# Patient Record
Sex: FEMALE | Race: WHITE | Employment: OTHER | URBAN - METROPOLITAN AREA
[De-identification: names, ages, dates, MRNs, and addresses within clinical notes are randomized per-mention and may not be internally consistent; named-entity substitution may affect disease eponyms.]

---

## 2017-02-23 ENCOUNTER — TRANSFERRED RECORDS (OUTPATIENT)
Dept: HEALTH INFORMATION MANAGEMENT | Facility: CLINIC | Age: 62
End: 2017-02-23

## 2017-03-06 ENCOUNTER — TRANSFERRED RECORDS (OUTPATIENT)
Dept: HEALTH INFORMATION MANAGEMENT | Facility: CLINIC | Age: 62
End: 2017-03-06

## 2017-03-06 DIAGNOSIS — I10 HYPERTENSION GOAL BP (BLOOD PRESSURE) < 140/90: ICD-10-CM

## 2017-03-06 NOTE — TELEPHONE ENCOUNTER
Routing refill request to provider for review/approval because:  Labs not current:  NO K+ OR CREATININE DONE IN OVER A YEAR  Patient needs to be seen because:  LAST BP FOLLOW UP 2/2016

## 2017-03-06 NOTE — TELEPHONE ENCOUNTER
Disp Refills Start End LEIF   losartan-hydrochlorothiazide (HYZAAR) 100-25 MG per tablet 90 tablet 3 2/22/2016  No   Sig: Take 1 tablet by mouth daily     Last Office Visit with [facility], [facility] or [facility] prescribing provider: 4-8-16    Next 5 appointments (look out 90 days)     Mar 20, 2017 10:00 AM CDT   Marge Kohler with TIN Kelley CNP   [facility] ([facility])    [address] [address] [phone]                   Potassium   Date Value Ref Range Status   02/22/2016 4.3 3.4 - 5.3 mmol/L Final     Creatinine   Date Value Ref Range Status   02/22/2016 0.68 0.52 - 1.04 mg/dL Final     BP Readings from Last 3 Encounters:   04/18/16 126/74   02/22/16 118/70   02/19/15 112/70

## 2017-03-07 RX ORDER — LOSARTAN POTASSIUM AND HYDROCHLOROTHIAZIDE 25; 100 MG/1; MG/1
1 TABLET ORAL DAILY
Qty: 90 TABLET | Refills: 0 | Status: SHIPPED | OUTPATIENT
Start: 2017-03-07 | End: 2017-03-29

## 2017-03-08 NOTE — TELEPHONE ENCOUNTER
Return call from patient she has office visit appointment 3/20/2017    Refill was approved for 3/7/2017 - patient also says she has enough medication to get to appointment  - pharmacy auto generated her request    Closing encounter - no further actions needed at this time    Angelo Wetzel RN

## 2017-03-29 ENCOUNTER — OFFICE VISIT (OUTPATIENT)
Dept: FAMILY MEDICINE | Facility: CLINIC | Age: 62
End: 2017-03-29
Payer: COMMERCIAL

## 2017-03-29 VITALS
OXYGEN SATURATION: 99 % | HEART RATE: 70 BPM | DIASTOLIC BLOOD PRESSURE: 70 MMHG | HEIGHT: 66 IN | RESPIRATION RATE: 18 BRPM | SYSTOLIC BLOOD PRESSURE: 107 MMHG | BODY MASS INDEX: 23.72 KG/M2 | WEIGHT: 147.6 LBS | TEMPERATURE: 97.6 F

## 2017-03-29 DIAGNOSIS — Z11.59 NEED FOR HEPATITIS C SCREENING TEST: ICD-10-CM

## 2017-03-29 DIAGNOSIS — Z13.6 CARDIOVASCULAR SCREENING; LDL GOAL LESS THAN 160: ICD-10-CM

## 2017-03-29 DIAGNOSIS — I10 HYPERTENSION GOAL BP (BLOOD PRESSURE) < 140/90: Primary | ICD-10-CM

## 2017-03-29 LAB
ALBUMIN SERPL-MCNC: 3.6 G/DL (ref 3.4–5)
ALP SERPL-CCNC: 101 U/L (ref 40–150)
ALT SERPL W P-5'-P-CCNC: 22 U/L (ref 0–50)
ANION GAP SERPL CALCULATED.3IONS-SCNC: 6 MMOL/L (ref 3–14)
AST SERPL W P-5'-P-CCNC: 14 U/L (ref 0–45)
BILIRUB SERPL-MCNC: 0.6 MG/DL (ref 0.2–1.3)
BUN SERPL-MCNC: 14 MG/DL (ref 7–30)
CALCIUM SERPL-MCNC: 9.4 MG/DL (ref 8.5–10.1)
CHLORIDE SERPL-SCNC: 100 MMOL/L (ref 94–109)
CHOLEST SERPL-MCNC: 129 MG/DL
CO2 SERPL-SCNC: 30 MMOL/L (ref 20–32)
CREAT SERPL-MCNC: 0.66 MG/DL (ref 0.52–1.04)
CREAT UR-MCNC: 59 MG/DL
GFR SERPL CREATININE-BSD FRML MDRD: NORMAL ML/MIN/1.7M2
GLUCOSE SERPL-MCNC: 92 MG/DL (ref 70–99)
HCV AB SERPL QL IA: NORMAL
HDLC SERPL-MCNC: 45 MG/DL
LDLC SERPL CALC-MCNC: 60 MG/DL
MICROALBUMIN UR-MCNC: <5 MG/L
MICROALBUMIN/CREAT UR: NORMAL MG/G CR (ref 0–25)
NONHDLC SERPL-MCNC: 84 MG/DL
POTASSIUM SERPL-SCNC: 4.5 MMOL/L (ref 3.4–5.3)
PROT SERPL-MCNC: 6.9 G/DL (ref 6.8–8.8)
SODIUM SERPL-SCNC: 136 MMOL/L (ref 133–144)
TRIGL SERPL-MCNC: 121 MG/DL

## 2017-03-29 PROCEDURE — 80061 LIPID PANEL: CPT | Performed by: NURSE PRACTITIONER

## 2017-03-29 PROCEDURE — 86803 HEPATITIS C AB TEST: CPT | Performed by: NURSE PRACTITIONER

## 2017-03-29 PROCEDURE — 82043 UR ALBUMIN QUANTITATIVE: CPT | Performed by: NURSE PRACTITIONER

## 2017-03-29 PROCEDURE — 99214 OFFICE O/P EST MOD 30 MIN: CPT | Performed by: NURSE PRACTITIONER

## 2017-03-29 PROCEDURE — 36415 COLL VENOUS BLD VENIPUNCTURE: CPT | Performed by: NURSE PRACTITIONER

## 2017-03-29 PROCEDURE — 80053 COMPREHEN METABOLIC PANEL: CPT | Performed by: NURSE PRACTITIONER

## 2017-03-29 RX ORDER — LOSARTAN POTASSIUM AND HYDROCHLOROTHIAZIDE 25; 100 MG/1; MG/1
1 TABLET ORAL DAILY
Qty: 90 TABLET | Refills: 3 | Status: SHIPPED | OUTPATIENT
Start: 2017-03-29 | End: 2018-05-07

## 2017-03-29 NOTE — PROGRESS NOTES
Nedra Bhat,    Here are the results of your recent lab studies. Everything looks great. Let me know if you have any questions.    Alivia CASTLE CNP

## 2017-03-29 NOTE — PROGRESS NOTES
SUBJECTIVE:                                                    Home Malloy is a 61 year old female who presents to clinic today for the following health issues:  Chief Complaint   Patient presents with     Hypertension         Hypertension Follow-up    Outpatient blood pressures are not being checked.    Low Salt Diet: no added salt   Home is in the clinic for her yearly follow up appointment.  She has been on hyzaar for several years for blood pressure management and things are going great.  No side effects or problems.  She reports she is feeling healthy.  No current fever, chills, nausea, vomiting, etc.  No chest pain or SOB.    Eyes: she continues care with the opthamologist for glaucoma.  No acute issues.    Problem list and histories reviewed & adjusted, as indicated.  Additional history: as documented    Patient Active Problem List   Diagnosis     Family history of malignant neoplasm of breast     Family history of colon cancer     Seasonal allergic rhinitis     Advanced directives, counseling/discussion     Left shoulder pain     Hypertension goal BP (blood pressure) < 140/90     Rosacea     CARDIOVASCULAR SCREENING; LDL GOAL LESS THAN 160     Glaucoma     Calcium blood increased     Past Surgical History:   Procedure Laterality Date     HC COLONOSCOPY THRU STOMA, DIAGNOSTIC  4/2009     HYSTERECTOMY, PAP NO LONGER INDICATED  2002    DENISE BSO       Social History   Substance Use Topics     Smoking status: Never Smoker     Smokeless tobacco: Not on file     Alcohol use No     Family History   Problem Relation Age of Onset     Hypertension Mother      Cancer - colorectal Mother      Colon Cancer Mother      DIABETES Father      Hypertension Father      Asthma Brother      CEREBROVASCULAR DISEASE Maternal Grandmother      Breast Cancer Sister      OSTEOPOROSIS Sister      Hearing Loss Sister      C.A.D. No family hx of          Current Outpatient Prescriptions   Medication Sig Dispense Refill      "losartan-hydrochlorothiazide (HYZAAR) 100-25 MG per tablet Take 1 tablet by mouth daily 90 tablet 0     fluticasone (FLONASE) 50 MCG/ACT nasal spray Spray 2 sprays into both nostrils daily 3 Package 3     metroNIDAZOLE (METROGEL) 1 % gel Apply 1 g topically daily       timolol (TIMOPTIC-XE) 0.5 % ophthalmic gel-forming        clobetasol (TEMOVATE) 0.05 % ointment Apply topically 2 times daily 30 g 0     sulfacetamide sodium, Acne, (KLARON) 10 % LOTN Externally apply 1 dose * topically daily 1 Bottle 0     Multiple Vitamins-Minerals (MULTIVITAL PO) Take by mouth daily       latanoprost (XALATAN) 0.005 % ophthalmic solution 1 drop At Bedtime.       Allergies   Allergen Reactions     Nkda [No Known Drug Allergies]      Recent Labs   Lab Test  02/22/16   1005  02/19/15   0811   08/13/13   0921   LDL  72  63   --   64   HDL  61  58   --   51   TRIG  94  90   --   116   ALT  26  21   --    --    CR  0.68  0.69   < >  0.71   GFRESTIMATED  89  87   < >  85   GFRESTBLACK  >90   GFR Calc    >90   GFR Calc     < >  >90   POTASSIUM  4.3  3.5   < >  4.3    < > = values in this interval not displayed.      BP Readings from Last 3 Encounters:   03/29/17 107/70   04/18/16 126/74   02/22/16 118/70    Wt Readings from Last 3 Encounters:   03/29/17 147 lb 9.6 oz (67 kg)   04/18/16 143 lb (64.9 kg)   02/22/16 149 lb 3.2 oz (67.7 kg)                  Labs reviewed in EPIC    Reviewed and updated as needed this visit by clinical staff  Tobacco  Allergies  Med Hx  Surg Hx  Fam Hx  Soc Hx      Reviewed and updated as needed this visit by Provider         ROS:  Constitutional, HEENT, cardiovascular, pulmonary, GI, , musculoskeletal, neuro, skin, endocrine and psych systems are negative, except as otherwise noted.    OBJECTIVE:                                                    /70  Pulse 70  Temp 97.6  F (36.4  C) (Oral)  Resp 18  Ht 5' 6\" (1.676 m)  Wt 147 lb 9.6 oz (67 kg)  SpO2 99%  " Breastfeeding? No  BMI 23.82 kg/m2  Body mass index is 23.82 kg/(m^2).  Constitutional: healthy, alert and no distress  Neck: supple, no adenopathy  Cardiovascular: RRR. No murmurs, clicks gallops or rub  Respiratory: Respirations easy and regular. No respiratory distress noted. Lung sounds clear to auscultation.  Gastrointestinal: abdomen flat, soft, nontender to light or deep palpation. Bowel sounds active in 4 quadrants. No hepatosplenomegaly. No rebound or guarding.  Neurologic: Gait normal. Reflexes normal and symmetric. Sensation grossly WNL.  Psychiatric: mentation appears normal and affect normal/bright       ASSESSMENT/PLAN:                                                    (I10) Hypertension goal BP (blood pressure) < 140/90  (primary encounter diagnosis)  Comment: controlled  Plan: losartan-hydrochlorothiazide (HYZAAR) 100-25 MG        per tablet, Comprehensive metabolic panel,         Albumin Random Urine Quantitative, Lipid panel         reflex to direct LDL, CANCELED: Albumin Random         Urine Quantitative        Medications were refilled x1 year.  Labs done/results currently pending.  Return to the clinic in one year or sooner if needed for concerns.    (Z11.59) Need for hepatitis C screening test  Comment:   Plan: Hepatitis C antibody         I discussed [facility] current initiative to have all people born between 1945 and 1965 to have a one time hepatitis C blood test drawn. If negative, no follow up is necessary. If positive, additional studies will be ordered.  She agrees today.    (Z13.6) CARDIOVASCULAR SCREENING; LDL GOAL LESS THAN 160  Comment:   Plan: Lipid panel reflex to direct LDL        Labs pending.    TIN Bell CNP  [facility]

## 2017-03-29 NOTE — MR AVS SNAPSHOT
After Visit Summary   3/29/2017    Home Malloy    MRN: [id number]           Patient Information     Date Of Birth          1955        Visit Information        Provider Department      3/29/2017 8:40 AM Alivia Small APRN [facility]        Today's Diagnoses     Need for hepatitis C screening test    -  1    Hypertension goal BP (blood pressure) < 140/90        CARDIOVASCULAR SCREENING; LDL GOAL LESS THAN 160          Care Instructions      Established High Blood Pressure    High blood pressure (hypertension) is a chronic disease. Often health care providers don t know what causes it. But it can be caused by certain health conditions and medicines.  If you have high blood pressure, you may not have any symptoms. If you do have symptoms, they may include headache, dizziness, changes in your vision, chest pain, and shortness of breath. But even without symptoms, high blood pressure that s not treated raises your risk for heart attack and stroke. High blood pressure is a serious health risk and shouldn t be ignored.  A blood pressure reading is made up of two numbers: a higher number over a lower number. The top number is the systolic pressure. The bottom number is the diastolic pressure. A normal blood pressure is less than 120 over less than 80.  High blood pressure is when either the top number is 140 or higher, or the bottom number is 90 or higher. This must be the result when taking your blood pressure a number of times. The blood pressures between normal and high are called prehypertension.  Home care  If you have high blood pressure, you should do what is listed below to lower your blood pressure. If you are taking medicines for high blood pressure, these methods may reduce or end your need for medicines in the future.    Begin a weight-loss program if you are overweight.    Cut back on how much salt you get in your diet. Here s how to do  this:    Don t eat foods that have a lot of salt. These include olives, pickles, smoked meats, and salted potato chips.    Don t add salt to your food at the table.    Use only small amounts of salt when cooking.    Begin an exercise program. Talk with your health care provider about the type of exercise program that would be best for you. It doesn't have to be hard. Even brisk walking for 20 minutes 3 times a week is a good form of exercise.    Don t take medicines that have heart stimulants. This includes many cold and sinus decongestant pills and sprays, as well as diet pills. Check the warnings about hypertension on the label. Stimulants such as amphetamine or cocaine could be lethal for someone with high blood pressure. Never take these.    Limit how much caffeine you get in your diet. Switch to caffeine-free products.    Stop smoking. If you are a long-time smoker, this can be hard. Enroll in a stop-smoking program to make it more likely that you will quit for good.    Learn how to handle stress. This is an important part of any program to lower blood pressure. Learn about relaxation methods like meditation, yoga, or biofeedback.    If your provider prescribed medicines, take them exactly as directed. Missing doses may cause your blood pressure get out of control.    Consider buying an automatic blood pressure machine. You can get one of these at most pharmacies. Use this to watch your blood pressure at home. Give the results to your provider.  Follow-up care  You will need to make regular visits to your health care provider. This is to check your blood pressure and to make changes to your medicines. Make a follow-up appointment as directed.  When to seek medical advice  Call your health care provider right away if any of these occur:    Chest pain or shortness of breath    Severe headache    Throbbing or rushing sound in the ears    Nosebleed    Sudden severe pain in your belly (abdomen)    Extreme  "drowsiness, confusion, or fainting    Dizziness or dizziness with a spinning sensation (vertigo)    Weakness of an arm or leg or one side of the face    You have problems speaking or seeing     4522-8558 [employer]. [address] [place] [address], [place] [address]. All rights reserved. This information is not intended as a substitute for professional medical care. Always follow your healthcare professional's instructions.              Follow-ups after your visit        Who to contact     If you have questions or need follow up information about today's clinic visit or your schedule please contact [facility] directly at [phone].  Normal or non-critical lab and imaging results will be communicated to you by MyChart, letter or phone within 4 business days after the clinic has received the results. If you do not hear from us within 7 days, please contact the clinic through [employer] or phone. If you have a critical or abnormal lab result, we will notify you by phone as soon as possible.  Submit refill requests through [employer] or call your pharmacy and they will forward the refill request to us. Please allow 3 business days for your refill to be completed.          Additional Information About Your Visit        [employer] Information     [employer] gives you secure access to your electronic health record. If you see a primary care provider, you can also send messages to your care team and make appointments. If you have questions, please call your primary care clinic.  If you do not have a primary care provider, please call [phone] and they will assist you.        Care EveryWhere ID     This is your Care EveryWhere ID. This could be used by other organizations to access your [facility] medical records  [id number]        Your Vitals Were     Pulse Temperature Respirations Height Pulse Oximetry Breastfeeding?    70 97.6  F (36.4  C) (Oral) 18 5' 6\" (1.676 m) 99% No    BMI (Body Mass Index)    "                23.82 kg/m2            Blood Pressure from Last 3 Encounters:   03/29/17 107/70   04/18/16 126/74   02/22/16 118/70    Weight from Last 3 Encounters:   03/29/17 147 lb 9.6 oz (67 kg)   04/18/16 143 lb (64.9 kg)   02/22/16 149 lb 3.2 oz (67.7 kg)              We Performed the Following     Albumin Random Urine Quantitative     Comprehensive metabolic panel     Hepatitis C antibody     Lipid panel reflex to direct LDL          Where to get your medicines      These medications were sent to [facility] - [place], [place] - [address] [place] [address]  [address] [facility] [address] [facility] [address]     Phone:  [phone]     losartan-hydrochlorothiazide 100-25 MG per tablet          Primary Care Provider Office Phone # Fax #    TIN Kelley [facility] [phone]       [facility] [address] [place] [address]        Thank you!     Thank you for choosing [facility]  for your care. Our goal is always to provide you with excellent care. Hearing back from our patients is one way we can continue to improve our services. Please take a few minutes to complete the written survey that you may receive in the mail after your visit with us. Thank you!             Your Updated Medication List - Protect others around you: Learn how to safely use, store and throw away your medicines at www.disposemymeds.org.          This list is accurate as of: 3/29/17  9:01 AM.  Always use your most recent med list.                   Brand Name Dispense Instructions for use    clobetasol 0.05 % ointment    TEMOVATE    30 g    Apply topically 2 times daily       fluticasone 50 MCG/ACT spray    FLONASE    3 Package    Spray 2 sprays into both nostrils daily       losartan-hydrochlorothiazide 100-25 MG per tablet    HYZAAR    90 tablet    Take 1 tablet by mouth daily       metroNIDAZOLE 1 % gel    METROGEL     Apply 1 g topically daily       MULTIVITAL PO       Take by mouth daily       sulfacetamide sodium (Acne) 10 % lotion    KLARON    1 Bottle    Externally apply 1 dose * topically daily       timolol 0.5 % ophthalmic gel-form    TIMOPTIC-XE         XALATAN 0.005 % ophthalmic solution   Generic drug:  latanoprost      1 drop At Bedtime.

## 2017-03-29 NOTE — NURSING NOTE
"Chief Complaint   Patient presents with     Hypertension       Initial /70  Pulse 70  Temp 97.6  F (36.4  C) (Oral)  Resp 18  Ht 5' 6\" (1.676 m)  Wt 147 lb 9.6 oz (67 kg)  SpO2 99%  Breastfeeding? No  BMI 23.82 kg/m2 Estimated body mass index is 23.82 kg/(m^2) as calculated from the following:    Height as of this encounter: 5' 6\" (1.676 m).    Weight as of this encounter: 147 lb 9.6 oz (67 kg).  Medication Reconciliation: complete     Nelli Ayers MA    "

## 2017-03-29 NOTE — PATIENT INSTRUCTIONS
Established High Blood Pressure    High blood pressure (hypertension) is a chronic disease. Often health care providers don t know what causes it. But it can be caused by certain health conditions and medicines.  If you have high blood pressure, you may not have any symptoms. If you do have symptoms, they may include headache, dizziness, changes in your vision, chest pain, and shortness of breath. But even without symptoms, high blood pressure that s not treated raises your risk for heart attack and stroke. High blood pressure is a serious health risk and shouldn t be ignored.  A blood pressure reading is made up of two numbers: a higher number over a lower number. The top number is the systolic pressure. The bottom number is the diastolic pressure. A normal blood pressure is less than 120 over less than 80.  High blood pressure is when either the top number is 140 or higher, or the bottom number is 90 or higher. This must be the result when taking your blood pressure a number of times. The blood pressures between normal and high are called prehypertension.  Home care  If you have high blood pressure, you should do what is listed below to lower your blood pressure. If you are taking medicines for high blood pressure, these methods may reduce or end your need for medicines in the future.    Begin a weight-loss program if you are overweight.    Cut back on how much salt you get in your diet. Here s how to do this:    Don t eat foods that have a lot of salt. These include olives, pickles, smoked meats, and salted potato chips.    Don t add salt to your food at the table.    Use only small amounts of salt when cooking.    Begin an exercise program. Talk with your health care provider about the type of exercise program that would be best for you. It doesn't have to be hard. Even brisk walking for 20 minutes 3 times a week is a good form of exercise.    Don t take medicines that have heart stimulants. This includes many  cold and sinus decongestant pills and sprays, as well as diet pills. Check the warnings about hypertension on the label. Stimulants such as amphetamine or cocaine could be lethal for someone with high blood pressure. Never take these.    Limit how much caffeine you get in your diet. Switch to caffeine-free products.    Stop smoking. If you are a long-time smoker, this can be hard. Enroll in a stop-smoking program to make it more likely that you will quit for good.    Learn how to handle stress. This is an important part of any program to lower blood pressure. Learn about relaxation methods like meditation, yoga, or biofeedback.    If your provider prescribed medicines, take them exactly as directed. Missing doses may cause your blood pressure get out of control.    Consider buying an automatic blood pressure machine. You can get one of these at most pharmacies. Use this to watch your blood pressure at home. Give the results to your provider.  Follow-up care  You will need to make regular visits to your health care provider. This is to check your blood pressure and to make changes to your medicines. Make a follow-up appointment as directed.  When to seek medical advice  Call your health care provider right away if any of these occur:    Chest pain or shortness of breath    Severe headache    Throbbing or rushing sound in the ears    Nosebleed    Sudden severe pain in your belly (abdomen)    Extreme drowsiness, confusion, or fainting    Dizziness or dizziness with a spinning sensation (vertigo)    Weakness of an arm or leg or one side of the face    You have problems speaking or seeing     1161-8001 [employer]. [address] [place] [address], [place] [address]. All rights reserved. This information is not intended as a substitute for professional medical care. Always follow your healthcare professional's instructions.

## 2018-04-12 ENCOUNTER — OFFICE VISIT (OUTPATIENT)
Dept: FAMILY MEDICINE | Facility: CLINIC | Age: 63
End: 2018-04-12
Payer: COMMERCIAL

## 2018-04-12 VITALS
SYSTOLIC BLOOD PRESSURE: 115 MMHG | OXYGEN SATURATION: 99 % | TEMPERATURE: 97.6 F | HEART RATE: 68 BPM | WEIGHT: 150 LBS | RESPIRATION RATE: 16 BRPM | HEIGHT: 66 IN | DIASTOLIC BLOOD PRESSURE: 77 MMHG | BODY MASS INDEX: 24.11 KG/M2

## 2018-04-12 DIAGNOSIS — L71.9 ROSACEA: ICD-10-CM

## 2018-04-12 DIAGNOSIS — I10 HYPERTENSION GOAL BP (BLOOD PRESSURE) < 140/90: Primary | ICD-10-CM

## 2018-04-12 DIAGNOSIS — E83.52 CALCIUM BLOOD INCREASED: ICD-10-CM

## 2018-04-12 DIAGNOSIS — Z12.11 SCREENING FOR COLON CANCER: ICD-10-CM

## 2018-04-12 LAB
ALBUMIN SERPL-MCNC: 3.9 G/DL (ref 3.4–5)
ALP SERPL-CCNC: 95 U/L (ref 40–150)
ALT SERPL W P-5'-P-CCNC: 22 U/L (ref 0–50)
ANION GAP SERPL CALCULATED.3IONS-SCNC: 4 MMOL/L (ref 3–14)
AST SERPL W P-5'-P-CCNC: 19 U/L (ref 0–45)
BILIRUB SERPL-MCNC: 0.6 MG/DL (ref 0.2–1.3)
BUN SERPL-MCNC: 17 MG/DL (ref 7–30)
CALCIUM SERPL-MCNC: 9.7 MG/DL (ref 8.5–10.1)
CHLORIDE SERPL-SCNC: 103 MMOL/L (ref 94–109)
CHOLEST SERPL-MCNC: 138 MG/DL
CO2 SERPL-SCNC: 30 MMOL/L (ref 20–32)
CREAT SERPL-MCNC: 0.7 MG/DL (ref 0.52–1.04)
CREAT UR-MCNC: 38 MG/DL
GFR SERPL CREATININE-BSD FRML MDRD: 85 ML/MIN/1.7M2
GLUCOSE SERPL-MCNC: 100 MG/DL (ref 70–99)
HDLC SERPL-MCNC: 58 MG/DL
LDLC SERPL CALC-MCNC: 63 MG/DL
MICROALBUMIN UR-MCNC: <5 MG/L
MICROALBUMIN/CREAT UR: NORMAL MG/G CR (ref 0–25)
NONHDLC SERPL-MCNC: 80 MG/DL
POTASSIUM SERPL-SCNC: 3.7 MMOL/L (ref 3.4–5.3)
PROT SERPL-MCNC: 7.2 G/DL (ref 6.8–8.8)
SODIUM SERPL-SCNC: 137 MMOL/L (ref 133–144)
TRIGL SERPL-MCNC: 86 MG/DL

## 2018-04-12 PROCEDURE — 99214 OFFICE O/P EST MOD 30 MIN: CPT | Performed by: NURSE PRACTITIONER

## 2018-04-12 PROCEDURE — 36415 COLL VENOUS BLD VENIPUNCTURE: CPT | Performed by: NURSE PRACTITIONER

## 2018-04-12 PROCEDURE — 80061 LIPID PANEL: CPT | Performed by: NURSE PRACTITIONER

## 2018-04-12 PROCEDURE — 80053 COMPREHEN METABOLIC PANEL: CPT | Performed by: NURSE PRACTITIONER

## 2018-04-12 PROCEDURE — 82043 UR ALBUMIN QUANTITATIVE: CPT | Performed by: NURSE PRACTITIONER

## 2018-04-12 NOTE — PATIENT INSTRUCTIONS
Check with your insurance:  Can you get your screening colonoscopy now and if not now, when can you get it?  Can you get the new shingles vaccine (a 2 shot series). Is it covered by [facility]?

## 2018-04-12 NOTE — MR AVS SNAPSHOT
After Visit Summary   4/12/2018    Home Malloy    MRN: [id number]           Patient Information     Date Of Birth          1955        Visit Information        Provider Department      4/12/2018 8:40 AM Alivia Small APRN CNP [facility]        Today's Diagnoses     Hypertension goal BP (blood pressure) < 140/90    -  1    Screening for colon cancer        Rosacea        Calcium blood increased          Care Instructions    Check with your insurance:  Can you get your screening colonoscopy now and if not now, when can you get it?  Can you get the new shingles vaccine (a 2 shot series). Is it covered by [employer]?              Follow-ups after your visit        Additional Services     GASTROENTEROLOGY ADULT REF PROCEDURE ONLY Marvin Madison [phone]; [facility]       Last Lab Result: Creatinine (mg/dL)       Date                     Value                 03/29/2017               0.66             ----------  Body mass index is 24.21 kg/(m^2).     Needed:  No  Language:  English    Patient will be contacted to schedule procedure.     Please be aware that coverage of these services is subject to the terms and limitations of your health insurance plan.  Call member services at your health plan with any benefit or coverage questions.  Any procedures must be performed at a [facility] facility OR coordinated by your clinic's referral office.    Please bring the following with you to your appointment:    (1) Any X-Rays, CTs or MRIs which have been performed.  Contact the facility where they were done to arrange for  prior to your scheduled appointment.    (2) List of current medications   (3) This referral request   (4) Any documents/labs given to you for this referral                  Who to contact     If you have questions or need follow up information about today's clinic visit or your schedule please contact [facility]  "NICOLE TOMLIN directly at [phone].  Normal or non-critical lab and imaging results will be communicated to you by MyChart, letter or phone within 4 business days after the clinic has received the results. If you do not hear from us within 7 days, please contact the clinic through MPV[employer] or phone. If you have a critical or abnormal lab result, we will notify you by phone as soon as possible.  Submit refill requests through LiveTop or call your pharmacy and they will forward the refill request to us. Please allow 3 business days for your refill to be completed.          Additional Information About Your Visit        [employer] Information     LiveTop gives you secure access to your electronic health record. If you see a primary care provider, you can also send messages to your care team and make appointments. If you have questions, please call your primary care clinic.  If you do not have a primary care provider, please call [phone] and they will assist you.        Care EveryWhere ID     This is your Care EveryWhere ID. This could be used by other organizations to access your [place] medical records  [id number]        Your Vitals Were     Pulse Temperature Respirations Height Pulse Oximetry Breastfeeding?    68 97.6  F (36.4  C) (Oral) 16 5' 6\" (1.676 m) 99% No    BMI (Body Mass Index)                   24.21 kg/m2            Blood Pressure from Last 3 Encounters:   04/12/18 115/77   03/29/17 107/70   04/18/16 126/74    Weight from Last 3 Encounters:   04/12/18 150 lb (68 kg)   03/29/17 147 lb 9.6 oz (67 kg)   04/18/16 143 lb (64.9 kg)              We Performed the Following     Albumin Random Urine Quantitative with Creat Ratio     Comprehensive metabolic panel     GASTROENTEROLOGY ADULT REF PROCEDURE ONLY Marvin Madison [phone]; [facility]     Lipid panel reflex to direct LDL Fasting          Today's Medication Changes          These changes are accurate as of 4/12/18  9:21 AM.  " If you have any questions, ask your nurse or doctor.               Stop taking these medicines if you haven't already. Please contact your care team if you have questions.     fluticasone 50 MCG/ACT spray   Commonly known as:  FLONASE   Stopped by:  Alivia Small APRN CNP                    Primary Care Provider Office Phone # Fax #    TIN Kelley [phone] [address] [place] [address]        Equal Access to Services     [facility] : Hadii aad ku hadasho Soomaali, waaxda luqadaha, qaybta kaalmada adeegyada, waxay idiin hayparamjit ferrari . So [facility] [phone].    ATENCIÓN: Si habla español, tiene a lomeli disposición servicios gratuitos de asistencia lingüística. [facility] [phone].    We comply with applicable federal civil rights laws and [place] laws. We do not discriminate on the basis of race, color, national origin, age, disability, sex, sexual orientation, or gender identity.            Thank you!     Thank you for choosing [facility]  for your care. Our goal is always to provide you with excellent care. Hearing back from our patients is one way we can continue to improve our services. Please take a few minutes to complete the written survey that you may receive in the mail after your visit with us. Thank you!             Your Updated Medication List - Protect others around you: Learn how to safely use, store and throw away your medicines at www.disposemymeds.org.          This list is accurate as of 4/12/18  9:21 AM.  Always use your most recent med list.                   Brand Name Dispense Instructions for use Diagnosis    clobetasol 0.05 % ointment    TEMOVATE    30 g    Apply topically 2 times daily    Dermatitis       losartan-hydrochlorothiazide 100-25 MG per tablet    HYZAAR    90 tablet    Take 1 tablet by mouth daily    Hypertension goal BP (blood pressure) < 140/90       metroNIDAZOLE 1 % gel     METROGEL     Apply 1 g topically daily        MULTIVITAL PO      Take by mouth daily        sulfacetamide sodium (Acne) 10 % lotion    KLARON    1 Bottle    Externally apply 1 dose * topically daily    Rosacea       timolol 0.5 % ophthalmic gel-form    TIMOPTIC-XE          XALATAN 0.005 % ophthalmic solution   Generic drug:  latanoprost      1 drop At Bedtime.

## 2018-04-12 NOTE — PROGRESS NOTES
SUBJECTIVE:   Home Malloy is a 62 year old female who presents to clinic today for the following health issues:      Hypertension     Hypertension:     Outpatient blood pressures:  Are not being checked    Dietary sodium intake::  No added salt diet    Diet:  Low salt  Frequency of exercise:  4-5 days/week  Duration of exercise:  45-60 minutes  Taking medications regularly:  Yes  Medication side effects:  None  Additional concerns today:  YES  History of Present Illness     Hypertension:     Outpatient blood pressures:  Are not being checked    Dietary sodium intake::  No added salt diet    Diet:  Low salt  Frequency of exercise:  4-5 days/week  Duration of exercise:  45-60 minutes  Taking medications regularly:  Yes  Medication side effects:  None  Additional concerns today:  YES    Problem list and histories reviewed & adjusted, as indicated.  Additional history: as documented    Patient Active Problem List   Diagnosis     Family history of malignant neoplasm of breast     Family history of colon cancer     Seasonal allergic rhinitis     Advanced directives, counseling/discussion     Left shoulder pain     Hypertension goal BP (blood pressure) < 140/90     Rosacea     CARDIOVASCULAR SCREENING; LDL GOAL LESS THAN 160     Glaucoma     Calcium blood increased     Past Surgical History:   Procedure Laterality Date     HC COLONOSCOPY THRU STOMA, DIAGNOSTIC  4/2009     HYSTERECTOMY, PAP NO LONGER INDICATED  2002    DENISE BSO       Social History   Substance Use Topics     Smoking status: Never Smoker     Smokeless tobacco: Never Used     Alcohol use No     Family History   Problem Relation Age of Onset     Hypertension Mother      Cancer - colorectal Mother      Colon Cancer Mother      DIABETES Father      Hypertension Father      Asthma Brother      CEREBROVASCULAR DISEASE Maternal Grandmother      Breast Cancer Sister      OSTEOPOROSIS Sister      Hearing Loss Sister      C.A.D. No family hx of       "    Current Outpatient Prescriptions   Medication Sig Dispense Refill     losartan-hydrochlorothiazide (HYZAAR) 100-25 MG per tablet Take 1 tablet by mouth daily 90 tablet 3     metroNIDAZOLE (METROGEL) 1 % gel Apply 1 g topically daily       timolol (TIMOPTIC-XE) 0.5 % ophthalmic gel-forming        clobetasol (TEMOVATE) 0.05 % ointment Apply topically 2 times daily 30 g 0     sulfacetamide sodium, Acne, (KLARON) 10 % LOTN Externally apply 1 dose * topically daily 1 Bottle 0     Multiple Vitamins-Minerals (MULTIVITAL PO) Take by mouth daily       latanoprost (XALATAN) 0.005 % ophthalmic solution 1 drop At Bedtime.       Allergies   Allergen Reactions     Nkda [No Known Drug Allergies]      Recent Labs   Lab Test  04/12/18   0923  03/29/17   0904  02/22/16   1005   LDL  63  60  72   HDL  58  45*  61   TRIG  86  121  94   ALT  22  22  26   CR  0.70  0.66  0.68   GFRESTIMATED  85  >90  Non  GFR Calc    89   GFRESTBLACK  >90  >90  African American GFR Calc    >90   GFR Calc     POTASSIUM  3.7  4.5  4.3      BP Readings from Last 3 Encounters:   04/12/18 115/77   03/29/17 107/70   04/18/16 126/74    Wt Readings from Last 3 Encounters:   04/12/18 150 lb (68 kg)   03/29/17 147 lb 9.6 oz (67 kg)   04/18/16 143 lb (64.9 kg)                  Labs reviewed in EPIC    ROS:  Constitutional, HEENT, cardiovascular, pulmonary, GI, , musculoskeletal, neuro, skin, endocrine and psych systems are negative, except as otherwise noted.    OBJECTIVE:     /77  Pulse 68  Temp 97.6  F (36.4  C) (Oral)  Resp 16  Ht 5' 6\" (1.676 m)  Wt 150 lb (68 kg)  SpO2 99%  Breastfeeding? No  BMI 24.21 kg/m2  Body mass index is 24.21 kg/(m^2).  GENERAL: healthy, alert and no distress  EYES: Eyes grossly normal to inspection, PERRL and conjunctivae and sclerae normal  HENT: ear canals and TM's normal, nose and mouth without ulcers or lesions  NECK: no adenopathy, no asymmetry, masses, or scars and thyroid " normal to palpation  RESP: lungs clear to auscultation - no rales, rhonchi or wheezes  BREAST: normal without masses, tenderness or nipple discharge and no palpable axillary masses or adenopathy  CV: regular rate and rhythm, normal S1 S2, no S3 or S4, no murmur, click or rub, no peripheral edema and peripheral pulses strong  ABDOMEN: soft, nontender, no hepatosplenomegaly, no masses and bowel sounds normal  MS: no gross musculoskeletal defects noted, no edema  SKIN: no suspicious lesions or rashes. on her face, she has erythematous patches and a few pustular lesions consistent with rosacea flare.  NEURO: Normal strength and tone, mentation intact and speech normal  PSYCH: mentation appears normal, affect normal/bright    Diagnostic Test Results:  Results for orders placed or performed in visit on 04/12/18   Comprehensive metabolic panel   Result Value Ref Range    Sodium 137 133 - 144 mmol/L    Potassium 3.7 3.4 - 5.3 mmol/L    Chloride 103 94 - 109 mmol/L    Carbon Dioxide 30 20 - 32 mmol/L    Anion Gap 4 3 - 14 mmol/L    Glucose 100 (H) 70 - 99 mg/dL    Urea Nitrogen 17 7 - 30 mg/dL    Creatinine 0.70 0.52 - 1.04 mg/dL    GFR Estimate 85 >60 mL/min/1.7m2    GFR Estimate If Black >90 >60 mL/min/1.7m2    Calcium 9.7 8.5 - 10.1 mg/dL    Bilirubin Total 0.6 0.2 - 1.3 mg/dL    Albumin 3.9 3.4 - 5.0 g/dL    Protein Total 7.2 6.8 - 8.8 g/dL    Alkaline Phosphatase 95 40 - 150 U/L    ALT 22 0 - 50 U/L    AST 19 0 - 45 U/L   Lipid panel reflex to direct LDL Fasting   Result Value Ref Range    Cholesterol 138 <200 mg/dL    Triglycerides 86 <150 mg/dL    HDL Cholesterol 58 >49 mg/dL    LDL Cholesterol Calculated 63 <100 mg/dL    Non HDL Cholesterol 80 <130 mg/dL   Albumin Random Urine Quantitative with Creat Ratio   Result Value Ref Range    Creatinine Urine 38 mg/dL    Albumin Urine mg/L <5 mg/L    Albumin Urine mg/g Cr Unable to calculate due to low value 0 - 25 mg/g Cr       ASSESSMENT/PLAN:     (I10) Hypertension goal BP  (blood pressure) < 140/90  (primary encounter diagnosis)  Comment: Controlled  Plan: Comprehensive metabolic panel, Lipid panel         reflex to direct LDL Fasting, Albumin Random         Urine Quantitative with Creat Ratio        I reviewed with Home today's blood pressure as well as her flowsheet of previous blood pressures.  Her blood pressure today looks great.  She is to continue to take her medication as prescribed.  Routine maintenance labs were ordered.  She is to return to the clinic yearly for med check appointments, sooner if she is having any problems.    (Z12.11) Screening for colon cancer  Comment: Routine  Plan: Routine screening colonoscopy now due.  Call for an appointment [facility] for routine colon cancer screening.    (L71.9) Rosacea  Comment: Chronic/current flare.  Plan:         Follow up with dermatology as planned.    (E83.52) Calcium blood increased  Comment: History of  Plan: Comprehensive metabolic panel        Calcium now normal and recheck        TIN Bell [facility]  Answers for HPI/ROS submitted by the patient on 4/12/2018   PHQ-2 Score: 0

## 2018-04-13 NOTE — PROGRESS NOTES
Nedra Bhat,    This is to let you know that the results of your recent blood tests are all normal.     Take care and have a great spring!  Alivia

## 2018-05-07 DIAGNOSIS — I10 HYPERTENSION GOAL BP (BLOOD PRESSURE) < 140/90: ICD-10-CM

## 2018-05-09 NOTE — TELEPHONE ENCOUNTER
"Requested Prescriptions   Pending Prescriptions Disp Refills     losartan-hydrochlorothiazide (HYZAAR) 100-25 MG per tablet [Pharmacy Med Name: LOSARTAN/HCTZ TABS 100/25]      Last Written Prescription Date:  3/29/2017  Last Fill Quantity: 90 tablets    ,  # refills: 3   Last Office Visit: 4/12/2018   Future Office Visit:      90 tablet 3     Sig: TAKE 1 TABLET DAILY    Angiotensin-II Receptors Passed    5/7/2018 11:49 PM       Passed - Blood pressure under 140/90 in past 12 months    BP Readings from Last 3 Encounters:   04/12/18 115/77   03/29/17 107/70   04/18/16 126/74          Passed - Recent (12 mo) or future (30 days) visit within the authorizing provider's specialty    Patient had office visit in the last 12 months or has a visit in the next 30 days with authorizing provider or within the authorizing provider's specialty.  See \"Patient Info\" tab in inbasket, or \"Choose Columns\" in Meds & Orders section of the refill encounter.           Passed - Patient is age 18 or older       Passed - No active pregnancy on record       Passed - Normal serum creatinine on file in past 12 months    Recent Labs   Lab Test  04/12/18   0923   CR  0.70          Passed - Normal serum potassium on file in past 12 months    Recent Labs   Lab Test  04/12/18   0923   POTASSIUM  3.7           Passed - No positive pregnancy test in past 12 months          "

## 2018-05-11 RX ORDER — LOSARTAN POTASSIUM AND HYDROCHLOROTHIAZIDE 25; 100 MG/1; MG/1
TABLET ORAL
Qty: 90 TABLET | Refills: 3 | Status: SHIPPED | OUTPATIENT
Start: 2018-05-11 | End: 2019-04-24

## 2018-06-13 ENCOUNTER — TRANSFERRED RECORDS (OUTPATIENT)
Dept: HEALTH INFORMATION MANAGEMENT | Facility: CLINIC | Age: 63
End: 2018-06-13

## 2019-04-24 ENCOUNTER — OFFICE VISIT (OUTPATIENT)
Dept: FAMILY MEDICINE | Facility: CLINIC | Age: 64
End: 2019-04-24
Payer: COMMERCIAL

## 2019-04-24 VITALS
SYSTOLIC BLOOD PRESSURE: 103 MMHG | DIASTOLIC BLOOD PRESSURE: 69 MMHG | TEMPERATURE: 97 F | BODY MASS INDEX: 24.75 KG/M2 | HEIGHT: 66 IN | RESPIRATION RATE: 16 BRPM | HEART RATE: 69 BPM | WEIGHT: 154 LBS | OXYGEN SATURATION: 96 %

## 2019-04-24 DIAGNOSIS — L71.9 ROSACEA: ICD-10-CM

## 2019-04-24 DIAGNOSIS — I10 HYPERTENSION GOAL BP (BLOOD PRESSURE) < 140/90: Primary | ICD-10-CM

## 2019-04-24 DIAGNOSIS — Z12.11 SCREENING FOR COLON CANCER: ICD-10-CM

## 2019-04-24 LAB
ANION GAP SERPL CALCULATED.3IONS-SCNC: 8 MMOL/L (ref 3–14)
BUN SERPL-MCNC: 16 MG/DL (ref 7–30)
CALCIUM SERPL-MCNC: 10.2 MG/DL (ref 8.5–10.1)
CHLORIDE SERPL-SCNC: 102 MMOL/L (ref 94–109)
CO2 SERPL-SCNC: 27 MMOL/L (ref 20–32)
CREAT SERPL-MCNC: 0.67 MG/DL (ref 0.52–1.04)
CREAT UR-MCNC: 68 MG/DL
GFR SERPL CREATININE-BSD FRML MDRD: >90 ML/MIN/{1.73_M2}
GLUCOSE SERPL-MCNC: 70 MG/DL (ref 70–99)
MICROALBUMIN UR-MCNC: 6 MG/L
MICROALBUMIN/CREAT UR: 8.29 MG/G CR (ref 0–25)
POTASSIUM SERPL-SCNC: 4.3 MMOL/L (ref 3.4–5.3)
SODIUM SERPL-SCNC: 137 MMOL/L (ref 133–144)

## 2019-04-24 PROCEDURE — 82043 UR ALBUMIN QUANTITATIVE: CPT | Performed by: NURSE PRACTITIONER

## 2019-04-24 PROCEDURE — 80048 BASIC METABOLIC PNL TOTAL CA: CPT | Performed by: NURSE PRACTITIONER

## 2019-04-24 PROCEDURE — 99214 OFFICE O/P EST MOD 30 MIN: CPT | Performed by: NURSE PRACTITIONER

## 2019-04-24 PROCEDURE — 36415 COLL VENOUS BLD VENIPUNCTURE: CPT | Performed by: NURSE PRACTITIONER

## 2019-04-24 RX ORDER — SULFACETAMIDE SODIUM 100 MG/ML
LOTION TOPICAL DAILY
COMMUNITY
Start: 2019-04-24

## 2019-04-24 RX ORDER — DOXYCYCLINE HYCLATE 50 MG/1
50 CAPSULE ORAL DAILY
COMMUNITY

## 2019-04-24 RX ORDER — METRONIDAZOLE 10 MG/G
1 GEL TOPICAL DAILY
COMMUNITY
Start: 2019-04-24

## 2019-04-24 RX ORDER — LOSARTAN POTASSIUM AND HYDROCHLOROTHIAZIDE 25; 100 MG/1; MG/1
1 TABLET ORAL DAILY
Qty: 90 TABLET | Refills: 3 | Status: SHIPPED | OUTPATIENT
Start: 2019-04-24 | End: 2020-04-20

## 2019-04-24 ASSESSMENT — MIFFLIN-ST. JEOR: SCORE: 1265.29

## 2019-04-24 NOTE — PROGRESS NOTES
SUBJECTIVE:   Home Malloy is a 63 year old female who presents to clinic today for the following   health issues:  Chief Complaint   Patient presents with     Hypertension     refill prescription     Rosacea         Hypertension Follow-up    Outpatient blood pressures are not being checked.    Low Salt Diet: no added salt    Amount of exercise or physical activity: 6-7 days/week for an average of 45-60 minutes    Problems taking medications regularly: No    Medication side effects: none    Diet: low salt  She has been taking her medication as prescribed and she denies any side effects of her medication.  Her blood pressure has been well controlled.    She denies chest pain or shortness of breath.      Rosacea:  Home has a history of facial rosacea.  She has tried several different products and has found that doxycycline 50mg once a day has been very helpful.  She does have some new areas on her skin that appeared to be getting a little bit worse.  She also has been using the topical agents.  She is wondering if she needs to go back to dermatology.      Additional history: as documented    Reviewed  and updated as needed this visit by clinical staff         Reviewed and updated as needed this visit by Provider         Patient Active Problem List   Diagnosis     Family history of malignant neoplasm of breast     Family history of colon cancer     Seasonal allergic rhinitis     Advanced directives, counseling/discussion     Left shoulder pain     Hypertension goal BP (blood pressure) < 140/90     Rosacea     CARDIOVASCULAR SCREENING; LDL GOAL LESS THAN 160     Glaucoma     Calcium blood increased     Past Surgical History:   Procedure Laterality Date     HC COLONOSCOPY THRU STOMA, DIAGNOSTIC  4/2009     HYSTERECTOMY, PAP NO LONGER INDICATED  2002    DENISE BSO       Social History     Tobacco Use     Smoking status: Never Smoker     Smokeless tobacco: Never Used   Substance Use Topics     Alcohol use: No      Family History   Problem Relation Age of Onset     Hypertension Mother      Cancer - colorectal Mother      Colon Cancer Mother      Diabetes Father      Hypertension Father      Asthma Brother      Cerebrovascular Disease Maternal Grandmother      Breast Cancer Sister      Osteoporosis Sister      Osteoporosis Sister      Hearing Loss Sister      C.A.D. No family hx of          Current Outpatient Medications   Medication Sig Dispense Refill     doxycycline hyclate (VIBRAMYCIN) 50 MG capsule Take 50 mg by mouth daily       latanoprost (XALATAN) 0.005 % ophthalmic solution 1 drop At Bedtime.       losartan-hydrochlorothiazide (HYZAAR) 100-25 MG tablet Take 1 tablet by mouth daily 90 tablet 3     metroNIDAZOLE (METROGEL) 1 % external gel Apply 1 g topically daily . Managed by Dermatology.       Multiple Vitamins-Minerals (MULTIVITAL PO) Take by mouth daily       sulfacetamide sodium, Acne, (KLARON) 10 % lotion daily . Managed by dermatology.       timolol (TIMOPTIC-XE) 0.5 % ophthalmic gel-forming        clobetasol (TEMOVATE) 0.05 % ointment Apply topically 2 times daily (Patient not taking: Reported on 4/24/2019) 30 g 0     Allergies   Allergen Reactions     Nkda [No Known Drug Allergies]      Recent Labs   Lab Test 04/12/18  0923 03/29/17  0904 02/22/16  1005   LDL 63 60 72   HDL 58 45* 61   TRIG 86 121 94   ALT 22 22 26   CR 0.70 0.66 0.68   GFRESTIMATED 85 >90  Non  GFR Calc   89   GFRESTBLACK >90 >90   GFR Calc   >90   GFR Calc     POTASSIUM 3.7 4.5 4.3      BP Readings from Last 3 Encounters:   04/24/19 103/69   04/12/18 115/77   03/29/17 107/70    Wt Readings from Last 3 Encounters:   04/24/19 69.9 kg (154 lb)   04/12/18 68 kg (150 lb)   03/29/17 67 kg (147 lb 9.6 oz)            Labs reviewed in EPIC    ROS:  Constitutional, HEENT, cardiovascular, pulmonary, GI, , musculoskeletal, neuro, skin, endocrine and psych systems are negative, except as otherwise  "noted.    OBJECTIVE:     /69 (BP Location: Right arm, Patient Position: Sitting, Cuff Size: Adult Large)   Pulse 69   Temp 97  F (36.1  C) (Oral)   Resp 16   Ht 1.676 m (5' 6\")   Wt 69.9 kg (154 lb)   SpO2 96%   BMI 24.86 kg/m    Body mass index is 24.86 kg/m .  GENERAL: healthy, alert and no distress  EYES: Eyes grossly normal to inspection, PERRL and conjunctivae and sclerae normal  NECK: no adenopathy and thyroid normal to palpation  RESP: lungs clear to auscultation - no rales, rhonchi or wheezes  CV: regular rate and rhythm, normal S1 S2, no S3 or S4, no murmur, click or rub, no peripheral edema and peripheral pulses strong  ABDOMEN: soft, nontender, no hepatosplenomegaly, no masses and bowel sounds normal. No rebound or guarding.   MS: no gross musculoskeletal defects noted, no edema. Ambulatory with a steady gait.   SKIN: warm and dry.  She does have scattered erythematous patches on her cheeks consistent with rosacea as well as to erythematous patches under her eyes/under whar her glasses meet her skin.  NEURO: Normal strength and tone, mentation intact and speech normal  PSYCH: mentation appears normal, affect normal/bright      ASSESSMENT/PLAN:     (I10) Hypertension goal BP (blood pressure) < 140/90  (primary encounter diagnosis)  Comment: Controlled  Plan: losartan-hydrochlorothiazide (HYZAAR) 100-25 MG        tablet, Basic metabolic panel, Albumin Random         Urine Quantitative with Creat Ratio        Refills were given.  She will continue to take her medications prescribed.  We did talk about the fact that Hyzaar is being recalled in certain situations.  According to this patient, the brand of Hyzaar that she is taking is not been recalled yet.  I did tell her that in the event that if her product is recalled, she is to let me know and we will make arrangements on what medication she can take.  She was thankful.    (L71.9) Rosacea  Comment: Worse  Plan: sulfacetamide sodium, Acne, " (KLARON) 10 %         lotion        For now, she will continue her current rosacea program and she will follow-up with the dermatologist.    (Z12.11) Screening for colon cancer  Comment: Routine  Plan: GASTROENTEROLOGY ADULT REF PROCEDURE ONLY         Marvin Los [phone]; [place]         General Surgery        Colonoscopy due now.  Referral was given and she will follow-up with the colonoscopy as we discussed.    TIN Bell [facility]

## 2019-04-25 NOTE — RESULT ENCOUNTER NOTE
Nedra Bhat,    This note is to let you know that your lab test results are stable. Your calcium came back just above the normal limit of 10.1 at 10.2. I am not overly worried about this result but I will plan to recheck this level in one year.    If there is anything else that I can do for you, please do not hesitate to let me know.    Alivia CASTLE CNP

## 2019-05-22 ENCOUNTER — TRANSFERRED RECORDS (OUTPATIENT)
Dept: HEALTH INFORMATION MANAGEMENT | Facility: CLINIC | Age: 64
End: 2019-05-22

## 2019-06-05 ENCOUNTER — HOSPITAL ENCOUNTER (OUTPATIENT)
Facility: CLINIC | Age: 64
Discharge: HOME | End: 2019-06-05
Attending: COLON & RECTAL SURGERY | Admitting: COLON & RECTAL SURGERY
Payer: COMMERCIAL

## 2019-06-05 VITALS
WEIGHT: 150 LBS | SYSTOLIC BLOOD PRESSURE: 114 MMHG | BODY MASS INDEX: 24.11 KG/M2 | HEART RATE: 60 BPM | HEIGHT: 66 IN | OXYGEN SATURATION: 98 % | RESPIRATION RATE: 14 BRPM | DIASTOLIC BLOOD PRESSURE: 71 MMHG

## 2019-06-05 LAB — COLONOSCOPY: NORMAL

## 2019-06-05 PROCEDURE — G0121 COLON CA SCRN NOT HI RSK IND: HCPCS | Performed by: COLON & RECTAL SURGERY

## 2019-06-05 PROCEDURE — 45378 DIAGNOSTIC COLONOSCOPY: CPT | Performed by: COLON & RECTAL SURGERY

## 2019-06-05 PROCEDURE — 25000128 H RX IP 250 OP 636: Performed by: COLON & RECTAL SURGERY

## 2019-06-05 PROCEDURE — G0500 MOD SEDAT ENDO SERVICE >5YRS: HCPCS | Performed by: COLON & RECTAL SURGERY

## 2019-06-05 RX ORDER — FENTANYL CITRATE 50 UG/ML
INJECTION, SOLUTION INTRAMUSCULAR; INTRAVENOUS PRN
Status: DISCONTINUED | OUTPATIENT
Start: 2019-06-05 | End: 2019-06-05 | Stop reason: HOSPADM

## 2019-06-05 RX ORDER — ONDANSETRON 2 MG/ML
4 INJECTION INTRAMUSCULAR; INTRAVENOUS
Status: DISCONTINUED | OUTPATIENT
Start: 2019-06-05 | End: 2019-06-05 | Stop reason: HOSPADM

## 2019-06-05 RX ORDER — LIDOCAINE 40 MG/G
CREAM TOPICAL
Status: DISCONTINUED | OUTPATIENT
Start: 2019-06-05 | End: 2019-06-05 | Stop reason: HOSPADM

## 2019-06-05 ASSESSMENT — MIFFLIN-ST. JEOR: SCORE: 1247.15

## 2019-06-05 NOTE — H&P
Colon & Rectal Surgery History and Physical  Pre-Endoscopy Procedure Note    History of Present Illness   I have been asked by Dr. Small to evaluate this 63 year old female for colorectal cancer screening. She had a normal screening colonoscopy in April 2009.  She currently denies any abdominal pain, weight loss, bleeding per rectum, or recent change in bowel habits.    Past Medical History  Diagnosis Date     Allergic rhinitis      Atopic dermatitis      Cataract      Glaucoma      Hypertension goal BP (blood pressure) < 140/90 7/13/2012     Rosacea        Past Surgical History     Past Surgical History:   Procedure Laterality Date     HYSTERECTOMY, PAP NO LONGER INDICATED  2002    DENISE BSO        Medications  Medication Sig     clobetasol (TEMOVATE) 0.05 % ointment Apply topically 2 times daily      doxycycline hyclate (VIBRAMYCIN) 50 MG capsule Take 50 mg by mouth daily     latanoprost (XALATAN) 0.005 % ophthalmic solution 1 drop At Bedtime.     losartan-hydrochlorothiazide (HYZAAR) 100-25 MG tablet Take 1 tablet by mouth daily     metroNIDAZOLE (METROGEL) 1 % external gel Apply 1 g topically daily      Multiple Vitamins-Minerals (MULTIVITAL PO) Take by mouth daily     sulfacetamide sodium, Acne, (KLARON) 10 % lotion daily      timolol (TIMOPTIC-XE) 0.5 % ophthalmic gel-forming        Allergies  Allergen Reactions     NKDA [No Known Drug Allergies]         Family History   Family history includes Asthma in her brother; Breast Cancer in her sister; Cancer - colorectal in her mother; Cerebrovascular Disease in her maternal grandmother; Colon Cancer in her mother; Diabetes in her father; Hearing Loss in her sister; Hypertension in her father and mother; Osteoporosis in her sister and sister.     Social History   She reports that she has never smoked. She has never used smokeless tobacco. She reports that she does not drink alcohol or use drugs.    Review of Systems   Constitutional:  No fever, weight change or  "fatigue.    Eyes:     No dry eyes or vision changes.   Ears/Nose/Throat/Neck:  No oral ulcers, sore throat or voice change.    Cardiovascular:   No palpitations, syncope, angina or edema.   Respiratory:    No chest pain, excessive sleepiness, shortness of breath or hemoptysis.    Gastrointestinal:   No abdominal pain, nausea, vomiting, diarrhea or heartburn.    Genitourinary:   No dysuria, hematuria, urinary retention or urinary frequency.   Musculoskeletal:  No joint swelling or arthralgias.    Dermatologic:  No skin rash or other skin changes.   Neurologic:    No focal weakness or numbness. No neuropathy.   Psychiatric:    No depression, anxiety, suicidal ideation, or paranoid ideation.   Endocrine:   No cold or heat intolerance, polydipsia, hirsutism, change in libido, or flushing.   Hematology/Lymphatic:  No bleeding or lymphadenopathy.    Allergy/Immunology:  No rhinitis or hives.     Physical Exam   Vitals:  /80, HR 69, RR 16, height 1.676 m (5' 6\"), weight 68 kg (150 lb), SpO2 100 %, not currently breastfeeding.    General:  Alert and oriented to person, place and time   Airway: Normal oropharyngeal airway and neck mobility   Lungs:  Clear bilaterally   Heart:  Regular rate and rhythm   Abdomen: Soft, NT, ND, no masses   Rectal:  Perianal skin without excoriation, hemorrhoidal disease or anal fissure        Digital rectal examination reveals normal sphincter tone without masses    ASA Grade: II (mild systemic disease)    Impression: Cleared for use of conscious sedation for colorectal cancer screening    Plan: Proceed with colonoscopy     Belkis Beltre MD  [facility]  [phone]  "

## 2019-07-25 ENCOUNTER — ANCILLARY PROCEDURE (OUTPATIENT)
Dept: MAMMOGRAPHY | Facility: CLINIC | Age: 64
End: 2019-07-25
Attending: NURSE PRACTITIONER
Payer: COMMERCIAL

## 2019-07-25 DIAGNOSIS — Z12.31 VISIT FOR SCREENING MAMMOGRAM: ICD-10-CM

## 2019-07-25 PROCEDURE — 77067 SCR MAMMO BI INCL CAD: CPT | Mod: TC

## 2020-03-01 ENCOUNTER — HEALTH MAINTENANCE LETTER (OUTPATIENT)
Age: 65
End: 2020-03-01

## 2020-04-17 DIAGNOSIS — I10 HYPERTENSION GOAL BP (BLOOD PRESSURE) < 140/90: ICD-10-CM

## 2020-04-18 NOTE — TELEPHONE ENCOUNTER
"losartan-hydrochlorothiazide (HYZAAR) 100-25 MG tablet  Last Written Prescription Date:  4/24/2019  Last Fill Quantity: 90 TABLET,  # refills: 3   Last office visit: 4/24/2019 with prescribing provider:  ROCAEL CHAVARRIA   Future Office Visit:      Requested Prescriptions   Pending Prescriptions Disp Refills     losartan-hydrochlorothiazide (HYZAAR) 100-25 MG tablet [Pharmacy Med Name: LOSARTAN/HCTZ TABS 100/25MG] 90 tablet 3     Sig: TAKE 1 TABLET DAILY       Angiotensin-II Receptors Passed - 4/17/2020 11:11 PM        Passed - Last blood pressure under 140/90 in past 12 months     BP Readings from Last 3 Encounters:   06/05/19 114/71   04/24/19 103/69   04/12/18 115/77           Passed - Recent (12 mo) or future (30 days) visit within the authorizing provider's specialty     Patient has had an office visit with the authorizing provider or a provider within the authorizing providers department within the previous 12 mos or has a future within next 30 days. See \"Patient Info\" tab in inbasket, or \"Choose Columns\" in Meds & Orders section of the refill encounter.            Passed - Medication is active on med list        Passed - Patient is age 18 or older        Passed - No active pregnancy on record        Passed - Normal serum creatinine on file in past 12 months     Recent Labs   Lab Test 04/24/19  0913   CR 0.67     Ok to refill medication if creatinine is low          Passed - Normal serum potassium on file in past 12 months     Recent Labs   Lab Test 04/24/19  0913   POTASSIUM 4.3            Passed - No positive pregnancy test in past 12 months       Diuretics (Including Combos) Protocol Passed - 4/17/2020 11:11 PM        Passed - Blood pressure under 140/90 in past 12 months     BP Readings from Last 3 Encounters:   06/05/19 114/71   04/24/19 103/69   04/12/18 115/77           Passed - Recent (12 mo) or future (30 days) visit within the authorizing provider's specialty     Patient has had an office visit " "with the authorizing provider or a provider within the authorizing providers department within the previous 12 mos or has a future within next 30 days. See \"Patient Info\" tab in inbasket, or \"Choose Columns\" in Meds & Orders section of the refill encounter.            Passed - Medication is active on med list        Passed - Patient is age 18 or older        Passed - No active pregancy on record        Passed - Normal serum creatinine on file in past 12 months     Recent Labs   Lab Test 04/24/19  0913   CR 0.67            Passed - Normal serum potassium on file in past 12 months     Recent Labs   Lab Test 04/24/19  0913   POTASSIUM 4.3            Passed - Normal serum sodium on file in past 12 months     Recent Labs   Lab Test 04/24/19  0913               Passed - No positive pregnancy test in past 12 months             "

## 2020-04-20 RX ORDER — LOSARTAN POTASSIUM AND HYDROCHLOROTHIAZIDE 25; 100 MG/1; MG/1
TABLET ORAL
Qty: 90 TABLET | Refills: 0 | Status: SHIPPED | OUTPATIENT
Start: 2020-04-20 | End: 2020-07-19

## 2020-04-20 NOTE — TELEPHONE ENCOUNTER
Prescription approved per [facility] Refill Protocol.  MYRA Martinez

## 2020-07-29 ENCOUNTER — NURSE TRIAGE (OUTPATIENT)
Dept: NURSING | Facility: CLINIC | Age: 65
End: 2020-07-29

## 2020-07-29 NOTE — TELEPHONE ENCOUNTER
Virtual visit for this request (as I can have her do a nurse only BP check and a lab only appointment) OR have her schedule a face to face appointment with me. I am in the clinic the next 2 weeks.

## 2020-07-29 NOTE — TELEPHONE ENCOUNTER
Needs medication renewed for BP meds.    She has not been checking it at home or at stores.    She is not sure if she can do phone, video or in person visit.  She would like call back from care team telling her what she is due for and help scheduling.    Anahy Mcdaniels RN  [facility] Nurse Advisor

## 2020-09-08 ENCOUNTER — OFFICE VISIT (OUTPATIENT)
Dept: FAMILY MEDICINE | Facility: CLINIC | Age: 65
End: 2020-09-08
Payer: MEDICARE

## 2020-09-08 VITALS
RESPIRATION RATE: 16 BRPM | HEIGHT: 67 IN | HEART RATE: 66 BPM | WEIGHT: 159 LBS | TEMPERATURE: 97.2 F | BODY MASS INDEX: 24.96 KG/M2 | OXYGEN SATURATION: 99 % | SYSTOLIC BLOOD PRESSURE: 128 MMHG | DIASTOLIC BLOOD PRESSURE: 80 MMHG

## 2020-09-08 DIAGNOSIS — Z12.39 SCREENING FOR BREAST CANCER: ICD-10-CM

## 2020-09-08 DIAGNOSIS — Z23 NEED FOR PROPHYLACTIC VACCINATION AND INOCULATION AGAINST INFLUENZA: ICD-10-CM

## 2020-09-08 DIAGNOSIS — I10 HYPERTENSION GOAL BP (BLOOD PRESSURE) < 140/90: Primary | ICD-10-CM

## 2020-09-08 DIAGNOSIS — Z23 NEED FOR VACCINATION: ICD-10-CM

## 2020-09-08 LAB
ANION GAP SERPL CALCULATED.3IONS-SCNC: 6 MMOL/L (ref 3–14)
BUN SERPL-MCNC: 15 MG/DL (ref 7–30)
CALCIUM SERPL-MCNC: 10.4 MG/DL (ref 8.5–10.1)
CHLORIDE SERPL-SCNC: 101 MMOL/L (ref 94–109)
CO2 SERPL-SCNC: 27 MMOL/L (ref 20–32)
CREAT SERPL-MCNC: 0.67 MG/DL (ref 0.52–1.04)
CREAT UR-MCNC: 33 MG/DL
GFR SERPL CREATININE-BSD FRML MDRD: >90 ML/MIN/{1.73_M2}
GLUCOSE SERPL-MCNC: 78 MG/DL (ref 70–99)
MICROALBUMIN UR-MCNC: 5 MG/L
MICROALBUMIN/CREAT UR: 15.43 MG/G CR (ref 0–25)
POTASSIUM SERPL-SCNC: 4.2 MMOL/L (ref 3.4–5.3)
SODIUM SERPL-SCNC: 134 MMOL/L (ref 133–144)

## 2020-09-08 PROCEDURE — 82043 UR ALBUMIN QUANTITATIVE: CPT | Performed by: NURSE PRACTITIONER

## 2020-09-08 PROCEDURE — 90472 IMMUNIZATION ADMIN EACH ADD: CPT | Performed by: NURSE PRACTITIONER

## 2020-09-08 PROCEDURE — G0009 ADMIN PNEUMOCOCCAL VACCINE: HCPCS | Performed by: NURSE PRACTITIONER

## 2020-09-08 PROCEDURE — 90662 IIV NO PRSV INCREASED AG IM: CPT | Performed by: NURSE PRACTITIONER

## 2020-09-08 PROCEDURE — 80048 BASIC METABOLIC PNL TOTAL CA: CPT | Performed by: NURSE PRACTITIONER

## 2020-09-08 PROCEDURE — 90715 TDAP VACCINE 7 YRS/> IM: CPT | Performed by: NURSE PRACTITIONER

## 2020-09-08 PROCEDURE — 36415 COLL VENOUS BLD VENIPUNCTURE: CPT | Performed by: NURSE PRACTITIONER

## 2020-09-08 PROCEDURE — 99213 OFFICE O/P EST LOW 20 MIN: CPT | Mod: 25 | Performed by: NURSE PRACTITIONER

## 2020-09-08 PROCEDURE — G0008 ADMIN INFLUENZA VIRUS VAC: HCPCS | Performed by: NURSE PRACTITIONER

## 2020-09-08 PROCEDURE — 90670 PCV13 VACCINE IM: CPT | Performed by: NURSE PRACTITIONER

## 2020-09-08 RX ORDER — LOSARTAN POTASSIUM AND HYDROCHLOROTHIAZIDE 25; 100 MG/1; MG/1
1 TABLET ORAL DAILY
Qty: 90 TABLET | Refills: 3 | Status: SHIPPED | OUTPATIENT
Start: 2020-09-08 | End: 2021-06-08

## 2020-09-08 ASSESSMENT — MIFFLIN-ST. JEOR: SCORE: 1295.19

## 2020-09-08 NOTE — NURSING NOTE
Prior to immunization administration, verified patients identity using patient s name and date of birth. Please see Immunization Activity for additional information.     Screening Questionnaire for Adult Immunization    Are you sick today?   No   Do you have allergies to medications, food, a vaccine component or latex?   No   Have you ever had a serious reaction after receiving a vaccination?   No   Do you have a long-term health problem with heart, lung, kidney, or metabolic disease (e.g., diabetes), asthma, a blood disorder, no spleen, complement component deficiency, a cochlear implant, or a spinal fluid leak?  Are you on long-term aspirin therapy?   No   Do you have cancer, leukemia, HIV/AIDS, or any other immune system problem?   No   Do you have a parent, brother, or sister with an immune system problem?   No   In the past 3 months, have you taken medications that affect  your immune system, such as prednisone, other steroids, or anticancer drugs; drugs for the treatment of rheumatoid arthritis, Crohn s disease, or psoriasis; or have you had radiation treatments?   No   Have you had a seizure, or a brain or other nervous system problem?   No   During the past year, have you received a transfusion of blood or blood    products, or been given immune (gamma) globulin or antiviral drug?   No   For women: Are you pregnant or is there a chance you could become       pregnant during the next month?   No   Have you received any vaccinations in the past 4 weeks?   No     Immunization questionnaire answers were all negative.        Per orders of Dr. ayon, injection of Adacel and pneumo-13 given by Joyce Zambrano MA. Patient instructed to remain in clinic for 15 minutes afterwards, and to report any adverse reaction to me immediately.       Screening performed by Joyce Zambrano MA on 9/8/2020 at 11:38 AM.

## 2020-09-08 NOTE — PROGRESS NOTES
"Subjective     Home Malloy is a 64 year old female who presents to clinic today for the following health issues:    HPI   Chief Complaint   Patient presents with     Hypertension     losartan refills needed       Exercise:  Regularly.  Walking.  3 miles a day.    Blood pressure:  Taking her losartan as prescribed.  No side effects or problems.  She is requesting refills of her medication today.    Immunization updates needed today.        Review of Systems   Constitutional, HEENT, cardiovascular, pulmonary, GI, , musculoskeletal, neuro, skin, endocrine and psych systems are negative, except as otherwise noted.      Objective    /80 (BP Location: Left arm, Patient Position: Sitting, Cuff Size: Adult Regular)   Pulse 66   Temp 97.2  F (36.2  C) (Temporal)   Resp 16   Ht 1.696 m (5' 6.77\")   Wt 72.1 kg (159 lb)   SpO2 99%   BMI 25.07 kg/m    Body mass index is 25.07 kg/m .  Physical Exam   GENERAL: healthy, alert and no distress  EYES: Eyes grossly normal to inspection, PERRL and conjunctivae and sclerae normal  HENT: ear canals and TM's normal, nose and mouth without ulcers or lesions  NECK: no adenopathy, no asymmetry, masses, or scars and thyroid normal to palpation  RESP: lungs clear to auscultation - no rales, rhonchi or wheezes  CV: regular rate and rhythm, normal S1 S2, no S3 or S4, no murmur, click or rub, no peripheral edema and peripheral pulses strong  ABDOMEN: soft, nontender, no hepatosplenomegaly, no masses and bowel sounds normal  MS: no gross musculoskeletal defects noted, no edema  SKIN: no suspicious lesions or rashes  NEURO: Normal strength and tone, mentation intact and speech normal  PSYCH: mentation appears normal, affect normal/bright    Diagnostics:  Labs done today, results pending        Assessment & Plan     (I10) Hypertension goal BP (blood pressure) < 140/90  (primary encounter diagnosis)  Comment: Controlled  Plan: losartan-hydrochlorothiazide (HYZAAR) 100-25 MG      " "  tablet, Basic metabolic panel  (Ca, Cl, CO2,         Creat, Gluc, K, Na, BUN), Albumin Random Urine         Quantitative with Creat Ratio        I did go ahead and refill her medications today.  She is to continue taking her medication every day.  Labs done today for monitoring.  Yearly clinic appointments for refills, sooner if problems.  She also received a total of 3 injections today and she does not want to have before the injection today.The shingles vaccine was discussed and considered today.  The patient would like to check her insurance coverage prior to agreeing to this injection.    (Z23) Need for prophylactic vaccination and inoculation against influenza  Comment: Routine  Plan: HC FLU VACCINE, INCREASED ANTIGEN, PRESV FREE,         ADMIN 1st VACCINE        Given today    (Z12.39) Screening for breast cancer  Comment: Routine  Plan: MA Screening Digital Bilateral        Routine screening mammogram at earliest convenience    (Z23) Need for vaccination  Comment: Routine  Plan: PCV13, IM (6+ WK) - [id number]        Given         BMI:   Estimated body mass index is 25.07 kg/m  as calculated from the following:    Height as of this encounter: 1.696 m (5' 6.77\").    Weight as of this encounter: 72.1 kg (159 lb).       See Patient Instructions    Return in about 1 year (around 9/8/2021) for Physical Exam, Medication follow up.    TIN Bell [facility]      "

## 2020-09-09 NOTE — RESULT ENCOUNTER NOTE
Nedra Bhat,    This note is to let you know that your lab results are good. You calcium level is stable from previous years and your kidney function studies are normal.    It was great seeing you again. I will plan to see you in one year, sooner if you need me.    Alivia CASTLE CNP

## 2020-10-01 ENCOUNTER — ANCILLARY PROCEDURE (OUTPATIENT)
Dept: MAMMOGRAPHY | Facility: CLINIC | Age: 65
End: 2020-10-01
Attending: NURSE PRACTITIONER
Payer: MEDICARE

## 2020-10-01 DIAGNOSIS — Z12.39 SCREENING FOR BREAST CANCER: ICD-10-CM

## 2020-10-01 PROCEDURE — 77067 SCR MAMMO BI INCL CAD: CPT | Mod: TC | Performed by: RADIOLOGY

## 2021-03-05 ENCOUNTER — NURSE TRIAGE (OUTPATIENT)
Dept: NURSING | Facility: CLINIC | Age: 66
End: 2021-03-05

## 2021-03-05 NOTE — TELEPHONE ENCOUNTER
Reason for Disposition    COVID-19 vaccine, Frequently Asked Questions (FAQs)    Protocols used: CORONAVIRUS (COVID-19) VACCINE QUESTIONS AND [id number] 1.3  Per CDC:   Are you feeling sick today? There is no evidence that acute illness reduces vaccine efficacy or increases vaccine adverse events. However, as a precaution with moderate or severe acute illness, all vaccines should be delayed until the illness has improved. Mild illnesses (e.g., upper respiratory infections, diarrhea) are NOT contraindications to vaccination. Do not withhold vaccination if a person is taking antibiotics.   From Pfizer:  Pfizer has said that some Phase III clinical trial participants experienced mild-to-moderate side effects with its investigational COVID-19 vaccine candidate. Scientists anticipate that the shots may cause mild flu-like side effects -- including sore arms, muscle aches, and fever. Therefore, it s recommended that you take ibuprofen or acetaminophen (if you can safely take them) before you get the vaccine.

## 2021-03-08 ENCOUNTER — IMMUNIZATION (OUTPATIENT)
Dept: NURSING | Facility: CLINIC | Age: 66
End: 2021-03-08
Payer: MEDICARE

## 2021-03-08 PROCEDURE — 0031A PR COVID VAC JANSSEN AD26 0.5ML: CPT

## 2021-03-08 PROCEDURE — 91303 PR COVID VAC JANSSEN AD26 0.5ML: CPT

## 2021-03-10 ENCOUNTER — OFFICE VISIT (OUTPATIENT)
Dept: FAMILY MEDICINE | Facility: CLINIC | Age: 66
End: 2021-03-10
Payer: MEDICARE

## 2021-03-10 VITALS
WEIGHT: 155 LBS | OXYGEN SATURATION: 97 % | TEMPERATURE: 97.1 F | DIASTOLIC BLOOD PRESSURE: 80 MMHG | BODY MASS INDEX: 24.33 KG/M2 | HEIGHT: 67 IN | HEART RATE: 79 BPM | RESPIRATION RATE: 16 BRPM | SYSTOLIC BLOOD PRESSURE: 144 MMHG

## 2021-03-10 DIAGNOSIS — H92.01 OTALGIA, RIGHT: Primary | ICD-10-CM

## 2021-03-10 DIAGNOSIS — I10 HYPERTENSION GOAL BP (BLOOD PRESSURE) < 140/90: ICD-10-CM

## 2021-03-10 DIAGNOSIS — M79.10 MUSCLE PAIN: ICD-10-CM

## 2021-03-10 PROCEDURE — 99214 OFFICE O/P EST MOD 30 MIN: CPT | Performed by: NURSE PRACTITIONER

## 2021-03-10 RX ORDER — AMLODIPINE BESYLATE 5 MG/1
5 TABLET ORAL DAILY
Qty: 90 TABLET | Refills: 0 | Status: SHIPPED | OUTPATIENT
Start: 2021-03-10 | End: 2021-06-04

## 2021-03-10 ASSESSMENT — MIFFLIN-ST. JEOR: SCORE: 1272.05

## 2021-03-10 NOTE — PROGRESS NOTES
Assessment & Plan     (H92.01) Otalgia, right  (primary encounter diagnosis)  Comment: Etiology uncertain  Plan: I reassured the patient today that her right ear exam appears normal and I am not sure why she is having the pain.  She is concerned about a potential dental problem and I did not notice any obvious dental or gum issues today but I did tell her to follow-up with the dentist as soon as possible.  In the event that this is a sinus issue, she can use over-the-counter Flonase as written on the packaging.  She is to follow-up with me with any worsening otherwise I will anticipate the her right ear pain is resolving.    (I10) Hypertension goal BP (blood pressure) < 140/90  Comment: Uncontrolled  Plan: amLODIPine (NORVASC) 5 MG tablet        Today's appointment, she needs blood pressure was high.  I encouraged her to continue her losartan hydrochlorothiazide daily as well as I added amlodipine 5 mg once a day.  I also encouraged her to continue her low-salt diet, regular exercise etc.  She is to come into the clinic in 2 to 4 weeks for blood pressure recheck, and she is to follow-up with me within 1 to 3 months for refills.  In the event that with her blood pressure recheck her blood pressures are not yet controlled, she is to follow-up with me soon.  She is to follow-up anytime if any side effects or problems with the new medication.    (M79.10) Muscle pain of neck, right shoulder, upper back  Comment: Acute  Plan: [facility]        She will start with physical therapy.  I encouraged gentle stretching and range of motion.  She will follow-up with me anytime if worse otherwise I will anticipate that her symptoms are resolving.         See Patient Instructions    Return in about 3 months (around 6/10/2021) for Medication follow up.    TIN Bell CNP  [facility]    Mady Bhat is a 65 year old who presents for the following health  "issues:    HPI       Chief Complaint   Patient presents with     Ear Problem     right ear to the jaw x couple of weeks       2 weeks ago Home started having Right ear pain.  A similar episode happened this winter.  Sometimes she has right sinus congestion.  She denies a head cold or URI.  No fever or chills.  Sometimes the pain radiates down the front of her neck and sometimes she thinks it is in her teeth/a dental problem.  She is eating and drinking normally.  She has not taken OTC products for the pain.    Right neck, arm, shoulder, upper back pain within the past few months.  She believes this is related to a more day to day sedentary life, ipad work, knitting etc.    Blood pressure:  Has been higher than usual.  She walks 3-4 miles per day.  \"I have to remind myself to drink water.\"  She has not been eating the healthiest diet.  She cooks for herself and does not add salt.   She uses no sodium products when she can.  She has not been eating out/restaurants.  Home blood pressures range 130-150/70-80.        Review of Systems   Constitutional, HEENT, cardiovascular, pulmonary, GI, , musculoskeletal, neuro, skin, endocrine and psych systems are negative, except as otherwise noted.      Objective    BP (!) 144/80 (BP Location: Right arm, Patient Position: Sitting, Cuff Size: Adult Regular)   Pulse 79   Temp 97.1  F (36.2  C) (Temporal)   Resp 16   Ht 1.696 m (5' 6.77\")   Wt 70.3 kg (155 lb)   SpO2 97%   BMI 24.44 kg/m    Body mass index is 24.44 kg/m .  Physical Exam   GENERAL: healthy, alert and no distress  NECK: no adenopathy, no asymmetry, masses, or scars and thyroid normal to palpation  RESP: lungs clear to auscultation - no rales, rhonchi or wheezes  CV: regular rate and rhythm, normal S1 S2, no S3 or S4, no murmur, click or rub, no peripheral edema and peripheral pulses strong  ABDOMEN: soft, nontender, no hepatosplenomegaly, no masses and bowel sounds normal  MS: No pedal edema noted.  She is " ambulatory with a steady gait.  SKIN: Warm and dry  PSYCH: mentation appears normal, affect normal/bright    Diagnostics:  Reviewed in epic

## 2021-03-17 ENCOUNTER — THERAPY VISIT (OUTPATIENT)
Dept: PHYSICAL THERAPY | Facility: CLINIC | Age: 66
End: 2021-03-17
Attending: NURSE PRACTITIONER
Payer: MEDICARE

## 2021-03-17 DIAGNOSIS — M79.10 MUSCLE PAIN: ICD-10-CM

## 2021-03-17 DIAGNOSIS — M54.12 CERVICAL RADICULITIS: ICD-10-CM

## 2021-03-17 DIAGNOSIS — M54.2 NECK PAIN: ICD-10-CM

## 2021-03-17 PROCEDURE — 97161 PT EVAL LOW COMPLEX 20 MIN: CPT | Mod: GP | Performed by: PHYSICAL THERAPIST

## 2021-03-17 PROCEDURE — 97110 THERAPEUTIC EXERCISES: CPT | Mod: GP | Performed by: PHYSICAL THERAPIST

## 2021-03-17 NOTE — PROGRESS NOTES
Physical Therapy Initial Evaluation  Subjective:  The history is provided by the patient.   Patient Health History  Home Malloy being seen for Stiffness and soreness in right shoulder, neck and arm.     Problem began: 2/24/2021.   Problem occurred: Time spent on iPad and weaving and knitting?   Pain is reported as 1/10 on pain scale.  General health as reported by patient is good.  Pertinent medical history includes: high blood pressure.     Medical allergies: none.   Surgeries include:  Other. Other surgery history details: Hysterectomy.    Current medications:  High blood pressure medication and other. Other medications details: Eye drops for glaucoma; treatment for rosacea.    Current occupation is Retired.   Primary job tasks include:  Prolonged sitting and repetitive tasks.   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                Therapist Generated HPI Evaluation  Problem details: Pt presents today with R sided upper trap/shoulder and RUE pain. Notes onset of symptoms on 2/24/21. Denies any inciting incident. Was dealing with a lot of ear/jaw pain at the time, which has since resolved. Pain since then has gotten better since. Currently has pain in R upper trap/scapula and in R forearm to digits 1-2. Describes pain as burning. Pain worse with looking down at the iPad for long periods of time and knitting (30 min to symptoms). Pain better with activity/walking. Pt is retired. MD orders as of 3/10/21..         Type of problem:  Cervical spine.    This is a new condition.                                             Objective:  System              Cervical/Thoracic Evaluation  Arom wnl cervical: Basline 1/10 burning in R neck/upper trap.     AROM:  AROM Cervical:    Flexion:          100% ROM, no change in sxs  Extension:       100% ROM, no change in sxs  Rotation:         Left:     Right:  Side Bend:      Left: 100% ROM, no change in sxs     Right:  100% ROM, no change in sxs      Headaches:  none  Cervical Myotomes:  normal                    Neural Tension:        Right side:  Radial; Ulnar and Median  negative.  Cervical Dermatomes:  normal                        Cervical Stability/Joint Clearing:  Stability/joint clearing spine: Limited mobility in cervicothoracic junction area.                                                      General Evaluation:                        Posture:    Standing:   Rounded shoulders, forward head and thoracic kyphosis increased  Sitting:  Rounded shoulders, forward head and thoracic kyphosis increased                                           ROS    Assessment/Plan:    Patient is a 65 year old female with cervical complaints.    Patient has the following significant findings with corresponding treatment plan.                Diagnosis 1:  R neck and RUE radiculopathy  Pain -  hot/cold therapy, US, electric stimulation, mechanical traction, manual therapy, splint/taping/bracing/orthotics, self management, education, directional preference exercise and home program  Decreased ROM/flexibility - manual therapy, therapeutic exercise, therapeutic activity and home program  Decreased joint mobility - manual therapy, therapeutic exercise, therapeutic activity and home program  Decreased strength - therapeutic exercise, therapeutic activities and home program  Impaired muscle performance - neuro re-education and home program  Decreased function - therapeutic activities and home program  Impaired posture - neuro re-education, therapeutic activities and home program    Therapy Evaluation Codes:   1) History comprised of:   Personal factors that impact the plan of care:      None.    Comorbidity factors that impact the plan of care are:      High blood pressure.     Medications impacting care: High blood pressure.  2) Examination of Body Systems comprised of:   Body structures and functions that impact the plan of care:      Cervical spine.   Activity limitations that impact the  plan of care are:      Cooking and Reading/Computer work.  3) Clinical presentation characteristics are:   Stable/Uncomplicated.  4) Decision-Making    Low complexity using standardized patient assessment instrument and/or measureable assessment of functional outcome.  Cumulative Therapy Evaluation is: Low complexity.    Previous and current functional limitations:  (See Goal Flow Sheet for this information)    Short term and Long term goals: (See Goal Flow Sheet for this information)     Communication ability:  Patient appears to be able to clearly communicate and understand verbal and written communication and follow directions correctly.  Treatment Explanation - The following has been discussed with the patient:   RX ordered/plan of care  Anticipated outcomes  Possible risks and side effects  This patient would benefit from PT intervention to resume normal activities.   Rehab potential is good.    Frequency:  1 X week, once daily  Duration:  for 8 weeks  Discharge Plan:  Achieve all LTG.  Independent in home treatment program.  Reach maximal therapeutic benefit.    Please refer to the daily flowsheet for treatment today, total treatment time and time spent performing 1:1 timed codes.

## 2021-03-17 NOTE — LETTER
DEPARTMENT OF HEALTH AND HUMAN SERVICES  CENTERS FOR MEDICARE & MEDICAID SERVICES    PLAN/UPDATED PLAN OF PROGRESS FOR OUTPATIENT REHABILITATION        PATIENTS NAME:  Home Malloy   : 1955  PROVIDER NUMBER:    [id number]  [id number]   PROVIDER NAME: [facility] [place]  MEDICAL RECORD NUMBER: [id number]   START OF CARE DATE:  SOC Date: 21   TYPE:  PT  PRIMARY/TREATMENT DIAGNOSIS: (Pertinent Medical Diagnosis)   Muscle pain  Neck pain  Cervical radiculitis  VISITS FROM START OF CARE:  Rxs Used: 1     Physical Therapy Initial Evaluation  Subjective:  The history is provided by the patient.   Patient Health History  Home Malloy being seen for Stiffness and soreness in right shoulder, neck and arm.   Problem began: 2021.   Problem occurred: Time spent on iPad and weaving and knitting?   Pain is reported as 1/10 on pain scale.  General health as reported by patient is good.  Pertinent medical history includes: high blood pressure.   Medical allergies: none.   Surgeries include:  Other. Other surgery history details: Hysterectomy.    Current medications:  High blood pressure medication and other. Other medications details: Eye drops for glaucoma; treatment for rosacea.    Current occupation is Retired.   Primary job tasks include:  Prolonged sitting and repetitive tasks.   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              Therapist Generated HPI Evaluation  Problem details: Pt presents today with R sided upper trap/shoulder and RUE pain. Notes onset of symptoms on 21. Denies any inciting incident. Was dealing with a lot of ear/jaw pain at the time, which has since resolved. Pain since then has gotten better since. Currently has pain in R upper trap/scapula and in R forearm to digits 1-2. Describes pain as burning. Pain worse with looking down at the iPad for long periods of time and knitting (30 min to symptoms). Pain better with  activity/walking. Pt is retired. MD orders as of 3/10/21..         Type of problem:  Cervical spine.  This is a new condition.    Objective:  Cervical/Thoracic Evaluation  Arom wnl cervical: Basline 1/10 burning in R neck/upper trap.     AROM:  AROM Cervical:  Flexion:          100% ROM, no change in sxs  PATIENTS NAME:  Home Malloy   : 1955    Extension:       100% ROM, no change in sxs  Rotation:         Left:     Right:  Side Bend:      Left: 100% ROM, no change in sxs     Right:  100% ROM, no change in sxs  Headaches: none  Cervical Myotomes:  normal  Neural Tension:    Right side:  Radial; Ulnar and Median  negative.  Cervical Dermatomes:  normal  Cervical Stability/Joint Clearing:  Stability/joint clearing spine: Limited mobility in cervicothoracic junction area.    General Evaluation:  Posture:    Standing:   Rounded shoulders, forward head and thoracic kyphosis increased  Sitting:  Rounded shoulders, forward head and thoracic kyphosis increased    Assessment/Plan:    Patient is a 65 year old female with cervical complaints.    Patient has the following significant findings with corresponding treatment plan.                Diagnosis 1:  R neck and RUE radiculopathy  Pain -  hot/cold therapy, US, electric stimulation, mechanical traction, manual therapy, splint/taping/bracing/orthotics, self management, education, directional preference exercise and home program  Decreased ROM/flexibility - manual therapy, therapeutic exercise, therapeutic activity and home program  Decreased joint mobility - manual therapy, therapeutic exercise, therapeutic activity and home program  Decreased strength - therapeutic exercise, therapeutic activities and home program  Impaired muscle performance - neuro re-education and home program  Decreased function - therapeutic activities and home program  Impaired posture - neuro re-education, therapeutic activities and home program    Therapy Evaluation Codes:   1) History  comprised of:   Personal factors that impact the plan of care:      None.    Comorbidity factors that impact the plan of care are:      High blood pressure.     Medications impacting care: High blood pressure.  2) Examination of Body Systems comprised of:   Body structures and functions that impact the plan of care:      Cervical spine.   Activity limitations that impact the plan of care are:      Cooking and Reading/Computer work.  3) Clinical presentation characteristics are:   Stable/Uncomplicated.  4) Decision-Making    Low complexity using standardized patient assessment instrument and/or measureable assessment of functional outcome.  Cumulative Therapy Evaluation is: Low complexity.    Previous and current functional limitations:  (See Goal Flow Sheet for this information)    PATIENTS NAME:  Home Malloy   : 1955    Short term and Long term goals: (See Goal Flow Sheet for this information)     Communication ability:  Patient appears to be able to clearly communicate and understand verbal and written communication and follow directions correctly.  Treatment Explanation - The following has been discussed with the patient:   RX ordered/plan of care  Anticipated outcomes  Possible risks and side effects  This patient would benefit from PT intervention to resume normal activities.   Rehab potential is good.    Frequency:  1 X week, once daily  Duration:  for 8 weeks  Discharge Plan:  Achieve all LTG.  Independent in home treatment program.  Reach maximal therapeutic benefit.        Caregiver Signature/Credentials _____________________________ Date ________       Treating Provider: Rolly Givens DPT   I have reviewed and certified the need for these services and plan of treatment while under my care.        PHYSICIAN'S SIGNATURE:   _________________________________________  Date___________   Alivia CASTLE CNP    Certification period:  Beginning of Cert date period: 21 to  End of Cert  "period date: 06/14/21     Functional Level Progress Report: Please see attached \"Goal Flow sheet for Functional level.\"    ____X____ Continue Services or       ________ DC Services                Service dates: From  SOC Date: 03/17/21 date to present                         "

## 2021-03-22 ENCOUNTER — TELEPHONE (OUTPATIENT)
Dept: FAMILY MEDICINE | Facility: CLINIC | Age: 66
End: 2021-03-22

## 2021-03-22 NOTE — TELEPHONE ENCOUNTER
Reason for Call:  Form, our goal is to have forms completed with 72 hours, however, some forms may require a visit or additional information.    Type of letter, form or note:  plan/updated plan of progress for outpatient rehabilitation    Who is the form from?: [facility] (if other please explain)    Where did the form come from: form was faxed in    What clinic location was the form placed at?: [facility]    Where the form was placed: placed in [facility] providers signature folder Box/Folder    What number is listed as a contact on the form?: [phone]       Additional comments:     Call taken on 3/22/2021 at 12:46 PM by Annmarie Joseph

## 2021-03-24 ENCOUNTER — THERAPY VISIT (OUTPATIENT)
Dept: PHYSICAL THERAPY | Facility: CLINIC | Age: 66
End: 2021-03-24
Payer: MEDICARE

## 2021-03-24 DIAGNOSIS — M54.2 NECK PAIN: ICD-10-CM

## 2021-03-24 DIAGNOSIS — M54.12 CERVICAL RADICULITIS: ICD-10-CM

## 2021-03-24 PROCEDURE — 97112 NEUROMUSCULAR REEDUCATION: CPT | Mod: GP | Performed by: PHYSICAL THERAPIST

## 2021-03-24 PROCEDURE — 97110 THERAPEUTIC EXERCISES: CPT | Mod: GP | Performed by: PHYSICAL THERAPIST

## 2021-03-31 ENCOUNTER — THERAPY VISIT (OUTPATIENT)
Dept: PHYSICAL THERAPY | Facility: CLINIC | Age: 66
End: 2021-03-31
Payer: MEDICARE

## 2021-03-31 DIAGNOSIS — M54.2 NECK PAIN: ICD-10-CM

## 2021-03-31 DIAGNOSIS — M54.12 CERVICAL RADICULITIS: ICD-10-CM

## 2021-03-31 PROCEDURE — 97110 THERAPEUTIC EXERCISES: CPT | Mod: GP | Performed by: PHYSICAL THERAPIST

## 2021-03-31 PROCEDURE — 97112 NEUROMUSCULAR REEDUCATION: CPT | Mod: GP | Performed by: PHYSICAL THERAPIST

## 2021-04-17 ENCOUNTER — HEALTH MAINTENANCE LETTER (OUTPATIENT)
Age: 66
End: 2021-04-17

## 2021-05-25 ENCOUNTER — RECORDS - HEALTHEAST (OUTPATIENT)
Dept: ADMINISTRATIVE | Facility: CLINIC | Age: 66
End: 2021-05-25

## 2021-05-26 ENCOUNTER — RECORDS - HEALTHEAST (OUTPATIENT)
Dept: ADMINISTRATIVE | Facility: CLINIC | Age: 66
End: 2021-05-26

## 2021-05-27 ENCOUNTER — RECORDS - HEALTHEAST (OUTPATIENT)
Dept: ADMINISTRATIVE | Facility: CLINIC | Age: 66
End: 2021-05-27

## 2021-06-02 DIAGNOSIS — I10 HYPERTENSION GOAL BP (BLOOD PRESSURE) < 140/90: ICD-10-CM

## 2021-06-04 RX ORDER — AMLODIPINE BESYLATE 5 MG/1
TABLET ORAL
Qty: 90 TABLET | Refills: 0 | Status: SHIPPED | OUTPATIENT
Start: 2021-06-04 | End: 2021-06-08

## 2021-06-04 NOTE — TELEPHONE ENCOUNTER
Routing refill request to provider for review/approval because:  Blood Pressure above range for RN protocol      Last Written Prescription Date:  3/10/21  Last Fill Quantity: 90,  # refills: 0   Last office visit: 3/10/2021 with prescribing provider:     Future Office Visit:   Next 5 appointments (look out 90 days)    Jun 08, 2021  9:00 AM  Marge Martinez with TIN Kelley CNP  [facility] ([facility] ) [address] [place] [address] [phone]

## 2021-06-08 ENCOUNTER — OFFICE VISIT (OUTPATIENT)
Dept: FAMILY MEDICINE | Facility: CLINIC | Age: 66
End: 2021-06-08
Payer: MEDICARE

## 2021-06-08 VITALS
HEART RATE: 68 BPM | WEIGHT: 157 LBS | HEIGHT: 66 IN | SYSTOLIC BLOOD PRESSURE: 120 MMHG | OXYGEN SATURATION: 97 % | RESPIRATION RATE: 16 BRPM | TEMPERATURE: 96.6 F | BODY MASS INDEX: 25.23 KG/M2 | DIASTOLIC BLOOD PRESSURE: 68 MMHG

## 2021-06-08 DIAGNOSIS — I10 HYPERTENSION GOAL BP (BLOOD PRESSURE) < 140/90: ICD-10-CM

## 2021-06-08 PROCEDURE — 99213 OFFICE O/P EST LOW 20 MIN: CPT | Performed by: NURSE PRACTITIONER

## 2021-06-08 RX ORDER — AMLODIPINE BESYLATE 5 MG/1
5 TABLET ORAL DAILY
Qty: 90 TABLET | Refills: 0 | Status: SHIPPED | OUTPATIENT
Start: 2021-06-08 | End: 2021-09-13

## 2021-06-08 RX ORDER — LOSARTAN POTASSIUM AND HYDROCHLOROTHIAZIDE 25; 100 MG/1; MG/1
1 TABLET ORAL DAILY
Qty: 90 TABLET | Refills: 0 | Status: SHIPPED | OUTPATIENT
Start: 2021-06-08 | End: 2021-09-13

## 2021-06-08 ASSESSMENT — MIFFLIN-ST. JEOR: SCORE: 1268.9

## 2021-06-08 NOTE — PATIENT INSTRUCTIONS
When you come in for your appointment in 3 months, please come in fasting (water is okay) and bring your home blood pressure monitor with you.        Patient Education     Established High Blood Pressure    High blood pressure (hypertension) is a long-term (chronic) disease. Often healthcare providers don t know what causes it. But it can be caused by certain health conditions and medicines.  If you have high blood pressure, you may not have any symptoms. If you do have symptoms, they may include:    Headache    Dizziness    Changes in your vision    Chest pain    Shortness of breath  But even without symptoms, high blood pressure that s not treated raises your risk for heart attack, heart failure, kidney disease, and stroke. High blood pressure is a serious health risk and shouldn t be ignored.  Blood pressure measurements are given as 2 numbers. Systolic blood pressure is the upper number. This is the pressure when the heart contracts. Diastolic blood pressure is the lower number. This is the pressure when the heart relaxes between beats. You will see your blood pressure readings written together. For example, a person with a systolic pressure of 118 and a diastolic pressure of 78 will have 118/78 written in the medical record.  Blood pressure is classified as normal, raised (elevated) or stage 1 or stage 2 high blood pressure:    Normal blood pressure. Systolic of less than 120 and diastolic of less than 80 (120/80).    Elevated blood pressure. Systolic of 120 to 129 and diastolic less than 80.    Stage 1 high blood pressure. Systolic is 130 to 139 or diastolic between 80 to 89.    Stage 2 high blood pressure. Systolic is 140 or higher or the diastolic is 90 or higher.  Home care  If you have high blood pressure, follow these home care guidelines to help lower your blood pressure. If you are taking medicines for high blood pressure, these methods may reduce or end your need for medicines in the future.    Start  a weight-loss program if you are overweight.    Cut back on how much salt you get in your diet. Here s how to do this:  ? Don t eat foods that have a lot of salt. These include olives, pickles, smoked meats, and salted potato chips.  ? Don t add salt to your food at the table.  ? Use only small amounts of salt when cooking.    Start an exercise program. Talk with your healthcare provider about the type of exercise program that would be best for you. It doesn't have to be hard. Even brisk walking for 20 minutes 3 times a week is a good form of exercise.    Don t take medicines that stimulate the heart. This includes many over-the-counter cold and sinus decongestant pills and sprays, as well as diet pills. Check the warnings about high blood pressure on the label. Before buying any over-the-counter medicines or supplements, always ask the pharmacist about the product's possible interaction with your high blood pressure and your high blood pressure medicines.    Stimulants such as amphetamine or cocaine could be deadly for someone with high blood pressure. Never take these.    Limit how much caffeine you get in your diet. Switch to caffeine-free products.    Stop smoking. If you are a long-time smoker, this can be hard. Talk with your healthcare provider about medicines and nicotine replacement options to help you. Also join a stop-smoking program . This makes it more likely that you will quit for good.    Learn how to handle stress. This is an important part of any program to lower blood pressure. Learn about relaxation methods such as meditation, yoga, or biofeedback.    If your provider prescribed medicines, take them exactly as directed. Missing doses may cause your blood pressure get out of control.    If you miss a dose, check with your healthcare provider or pharmacist about what to do.    Think about buying an automatic blood pressure machine to check your blood pressure at home. Ask your provider for a  recommendation. You can get one of these at most pharmacies.  Using a home blood pressure monitor  The American Heart Association advises the following guidelines for home blood pressure monitoring:    Don't smoke or drink coffee for 30 minutes before taking your blood pressure.    Go to the bathroom before the test.    Relax for 5 minutes before taking the measurement.    Sit with your back supported (don't sit on a couch or soft chair). Keep your feet on the floor uncrossed. Place your arm on a solid flat surface (such as a table) with the upper part of the arm at heart level. Place the middle of the cuff directly above the bend of the elbow. Check the monitor's instruction manual for an illustration.    Take multiple readings. When you measure, take 2 to 3 readings one minute apart and record all of the results.    Take your blood pressure at the same time every day, or as your healthcare provider advises.    Record the date, time, and blood pressure reading.    Take the record with you to your next healthcare appointment. If your blood pressure monitor has a built-in memory, just take the monitor with you to your next appointment.    Call your provider if you have several high readings. Don't be frightened by one high blood pressure reading. But if you get a few high readings, check in with your healthcare provider.  Follow-up care  You will need to see your healthcare provider regularly. This is to check your blood pressure and to make changes to your medicines. Make a follow-up appointment as directed. Bring the record of your home blood pressure readings to the appointment.  Call 911  Call 911 if you have any of these:    Blood pressure of 180/120 or higher    Chest pain or shortness of breath    Weakness of an arm or leg or one side of the face    Problems speaking or seeing     When to get medical advice  Call your healthcare provider right away if any of these occur:    Severe headache    Throbbing or  rushing sound in the ears    Nosebleed    Sudden severe pain in your belly (abdomen)    Extreme drowsiness, confusion, or fainting    Dizziness or spinning feeling (vertigo)  Yary last reviewed this educational content on 7/1/2019 2000-2021 [employer]. All rights reserved. This information is not intended as a substitute for professional medical care. Always follow your healthcare professional's instructions.

## 2021-06-08 NOTE — PROGRESS NOTES
"    Assessment & Plan     (I10) Hypertension goal BP (blood pressure) < 140/90  Comment: Controlled  Plan: amLODIPine (NORVASC) 5 MG tablet,         losartan-hydrochlorothiazide (HYZAAR) 100-25 MG        tablet          For today, I told Janet that even though her home blood pressures are running a little bit higher, her blood pressure today in our clinic is normal.  I do want her to bring in her blood pressure monitor with her next appointment.  She is due a follow-up appointment in 3 months for her complete physical, labs etc.  She will continue intermittent blood pressure monitoring in the meantime and she will come in sooner if the blood pressure goes up or if she has any other symptoms of concern.  I did encourage her to continue her exercise, low-salt diet etc.  Questions were answered.    When she comes into the clinic for her 3-month appointment, she will establish care with a new PCP.  She is appreciative.         BMI:   Estimated body mass index is 25.34 kg/m  as calculated from the following:    Height as of this encounter: 1.676 m (5' 6\").    Weight as of this encounter: 71.2 kg (157 lb).       See Patient Instructions    Return in about 3 months (around 9/8/2021) for Medication follow up, Physical Exam.    T[facility]    Mady Bhat is a 65 year old who presents for the following health issues:    History of Present Illness       Hypertension: She presents for follow up of hypertension.  She does check blood pressure  regularly outside of the clinic. Outside blood pressures have been over 140/90. She follows a low salt diet.         Home has had high blood pressure medication for several years.  In March 2021, her clinic BP was high and amlodipine 5mg per day was added.  Since adding the amlopidine, she has not noticed a significant change in her BP, \"maybe it is a little better.\"  No noted side effects or problems.  She eats a lower " "salt.  She does eat some processed food.  She denies chest pain or shortness of breath.  She did have 1 day of some swelling in her foot that she linked with aggressive exercise and she has not had any swelling since.  She walks 2 to 4 miles a day, more when it is not hot outside.    With her home blood pressure readings:  Systolic 110-150  Diastolic 60s to 80s    Review of Systems   Constitutional, HEENT, cardiovascular, pulmonary, GI, , musculoskeletal, neuro, skin, endocrine and psych systems are negative, except as otherwise noted.      Objective    /68 (BP Location: Right arm, Patient Position: Sitting, Cuff Size: Adult Regular)   Pulse 68   Temp 96.6  F (35.9  C) (Temporal)   Resp 16   Ht 1.676 m (5' 6\")   Wt 71.2 kg (157 lb)   SpO2 97%   BMI 25.34 kg/m    Body mass index is 25.34 kg/m .  Physical Exam   GENERAL: healthy, alert and no distress  EYES: Eyes grossly normal to inspection, PERRL and conjunctivae and sclerae normal  HENT: ear canals and TM's normal, nose and mouth without ulcers or lesions  NECK: no adenopathy and thyroid normal to palpation  RESP: lungs clear to auscultation - no rales, rhonchi or wheezes  CV: regular rate and rhythm, normal S1 S2, no S3 or S4, no murmur, click or rub, no peripheral edema and peripheral pulses strong    MS: no gross musculoskeletal defects noted, no edema. Ambulatory with a steady gait.   SKIN: warm and dry  NEURO: Normal strength and tone, mentation intact and speech normal  PSYCH: mentation appears normal, affect normal/bright      Diagnostics:  Reviewed in Epic.            "

## 2021-09-13 ENCOUNTER — OFFICE VISIT (OUTPATIENT)
Dept: FAMILY MEDICINE | Facility: CLINIC | Age: 66
End: 2021-09-13
Payer: MEDICARE

## 2021-09-13 VITALS
DIASTOLIC BLOOD PRESSURE: 77 MMHG | SYSTOLIC BLOOD PRESSURE: 129 MMHG | OXYGEN SATURATION: 100 % | HEART RATE: 65 BPM | TEMPERATURE: 98.7 F | RESPIRATION RATE: 14 BRPM

## 2021-09-13 DIAGNOSIS — Z23 NEED FOR PROPHYLACTIC VACCINATION AND INOCULATION AGAINST INFLUENZA: ICD-10-CM

## 2021-09-13 DIAGNOSIS — I10 HYPERTENSION GOAL BP (BLOOD PRESSURE) < 140/90: Primary | ICD-10-CM

## 2021-09-13 PROCEDURE — G0008 ADMIN INFLUENZA VIRUS VAC: HCPCS | Performed by: NURSE PRACTITIONER

## 2021-09-13 PROCEDURE — 90662 IIV NO PRSV INCREASED AG IM: CPT | Performed by: NURSE PRACTITIONER

## 2021-09-13 PROCEDURE — 99214 OFFICE O/P EST MOD 30 MIN: CPT | Mod: 25 | Performed by: NURSE PRACTITIONER

## 2021-09-13 PROCEDURE — 80053 COMPREHEN METABOLIC PANEL: CPT | Performed by: NURSE PRACTITIONER

## 2021-09-13 PROCEDURE — 36415 COLL VENOUS BLD VENIPUNCTURE: CPT | Performed by: NURSE PRACTITIONER

## 2021-09-13 PROCEDURE — 82043 UR ALBUMIN QUANTITATIVE: CPT | Performed by: NURSE PRACTITIONER

## 2021-09-13 PROCEDURE — 80061 LIPID PANEL: CPT | Performed by: NURSE PRACTITIONER

## 2021-09-13 RX ORDER — LOSARTAN POTASSIUM AND HYDROCHLOROTHIAZIDE 25; 100 MG/1; MG/1
1 TABLET ORAL DAILY
Qty: 90 TABLET | Refills: 3 | Status: SHIPPED | OUTPATIENT
Start: 2021-09-13 | End: 2022-10-24

## 2021-09-13 RX ORDER — AMLODIPINE BESYLATE 5 MG/1
5 TABLET ORAL DAILY
Qty: 90 TABLET | Refills: 3 | Status: SHIPPED | OUTPATIENT
Start: 2021-09-13 | End: 2022-07-27

## 2021-09-13 NOTE — NURSING NOTE

## 2021-09-13 NOTE — PROGRESS NOTES
Assessment & Plan     Hypertension goal BP (blood pressure) < 140/90  Discussed sodium restriction, maintaining ideal body weight and regular exercise program as physiologic means to achieve blood pressure control. The patient will strive towards this. Meanwhile, it is appropriate to lower BP with medications, while observing for therapeutic effect and if appropriate later, can discontinue medications if physiologic methods appear to be effective. The patient indicates understanding of these issues and agrees with the plan. Side effects explained in detail. Continue home readings and return in 12 months.  Discussed long term complications of untreated htn.    - amLODIPine (NORVASC) 5 MG tablet; Take 1 tablet (5 mg) by mouth daily  - losartan-hydrochlorothiazide (HYZAAR) 100-25 MG tablet; Take 1 tablet by mouth daily  - Lipid panel reflex to direct LDL Non-fasting; Future  - Comprehensive metabolic panel (BMP + Alb, Alk Phos, ALT, AST, Total. Bili, TP); Future  - Albumin Random Urine Quantitative with Creat Ratio; Future  - Lipid panel reflex to direct LDL Non-fasting  - Comprehensive metabolic panel (BMP + Alb, Alk Phos, ALT, AST, Total. Bili, TP)  - Albumin Random Urine Quantitative with Creat Ratio    Need for prophylactic vaccination and inoculation against influenza  Flu shot done.    - INFLUENZA, QUAD, HIGH DOSE, PF, 65YR + (FLUZONE HD)      No follow-ups on file.    [facility]    Mady Bhat is a 65 year old who presents for the following health issues     HPI     Hypertension Follow-up      Do you check your blood pressure regularly outside of the clinic? Yes     Are you following a low salt diet? Yes    Are your blood pressures ever more than 140 on the top number (systolic) OR more   than 90 on the bottom number (diastolic), for example 140/90? Yes      How many servings of fruits and vegetables do you eat daily?  2-3    On average, how  many sweetened beverages do you drink each day (Examples: soda, juice, sweet tea, etc.  Do NOT count diet or artificially sweetened beverages)?   2    How many days per week do you exercise enough to make your heart beat faster? 7    How many minutes a day do you exercise enough to make your heart beat faster? 20 - 29    How many days per week do you miss taking your medication? 0    Feeling well  Due for labs  Due for refills  BP's at home mostly at goal.      Review of Systems   Constitutional, HEENT, cardiovascular, pulmonary, GI, , musculoskeletal, neuro, skin, endocrine and psych systems are negative, except as otherwise noted.      Objective    /77 (BP Location: Left arm, Patient Position: Chair, Cuff Size: Adult Regular)   Pulse 65   Temp 98.7  F (37.1  C) (Oral)   Resp 14   SpO2 100%   There is no height or weight on file to calculate BMI.  Physical Exam   GENERAL: healthy, alert and no distress  EYES: Eyes grossly normal to inspection, PERRL and conjunctivae and sclerae normal  NECK: no adenopathy, no asymmetry, masses, or scars and thyroid normal to palpation  RESP: lungs clear to auscultation - no rales, rhonchi or wheezes  CV: regular rate and rhythm, normal S1 S2, no S3 or S4, no murmur, click or rub, no peripheral edema and peripheral pulses strong  ABDOMEN: soft, nontender, no hepatosplenomegaly, no masses and bowel sounds normal  MS: no gross musculoskeletal defects noted, no edema  SKIN: no suspicious lesions or rashes

## 2021-09-14 LAB
ALBUMIN SERPL-MCNC: 3.8 G/DL (ref 3.4–5)
ALP SERPL-CCNC: 110 U/L (ref 40–150)
ALT SERPL W P-5'-P-CCNC: 30 U/L (ref 0–50)
ANION GAP SERPL CALCULATED.3IONS-SCNC: 4 MMOL/L (ref 3–14)
AST SERPL W P-5'-P-CCNC: 20 U/L (ref 0–45)
BILIRUB SERPL-MCNC: 0.5 MG/DL (ref 0.2–1.3)
BUN SERPL-MCNC: 16 MG/DL (ref 7–30)
CALCIUM SERPL-MCNC: 10.4 MG/DL (ref 8.5–10.1)
CHLORIDE BLD-SCNC: 98 MMOL/L (ref 94–109)
CHOLEST SERPL-MCNC: 166 MG/DL
CO2 SERPL-SCNC: 30 MMOL/L (ref 20–32)
CREAT SERPL-MCNC: 0.74 MG/DL (ref 0.52–1.04)
CREAT UR-MCNC: 32 MG/DL
FASTING STATUS PATIENT QL REPORTED: YES
GFR SERPL CREATININE-BSD FRML MDRD: 85 ML/MIN/1.73M2
GLUCOSE BLD-MCNC: 78 MG/DL (ref 70–99)
HDLC SERPL-MCNC: 60 MG/DL
LDLC SERPL CALC-MCNC: 82 MG/DL
MICROALBUMIN UR-MCNC: 6 MG/L
MICROALBUMIN/CREAT UR: 18.75 MG/G CR (ref 0–25)
NONHDLC SERPL-MCNC: 106 MG/DL
POTASSIUM BLD-SCNC: 5.1 MMOL/L (ref 3.4–5.3)
PROT SERPL-MCNC: 7.2 G/DL (ref 6.8–8.8)
SODIUM SERPL-SCNC: 132 MMOL/L (ref 133–144)
TRIGL SERPL-MCNC: 120 MG/DL

## 2021-10-23 ENCOUNTER — MYC MEDICAL ADVICE (OUTPATIENT)
Dept: FAMILY MEDICINE | Facility: CLINIC | Age: 66
End: 2021-10-23

## 2021-10-25 ENCOUNTER — ANCILLARY PROCEDURE (OUTPATIENT)
Dept: MAMMOGRAPHY | Facility: CLINIC | Age: 66
End: 2021-10-25
Attending: NURSE PRACTITIONER
Payer: MEDICARE

## 2021-10-25 DIAGNOSIS — Z12.31 VISIT FOR SCREENING MAMMOGRAM: ICD-10-CM

## 2021-10-25 PROCEDURE — 77067 SCR MAMMO BI INCL CAD: CPT | Mod: TC | Performed by: RADIOLOGY

## 2021-10-25 PROCEDURE — 77063 BREAST TOMOSYNTHESIS BI: CPT | Mod: TC | Performed by: RADIOLOGY

## 2022-05-08 ENCOUNTER — HEALTH MAINTENANCE LETTER (OUTPATIENT)
Age: 67
End: 2022-05-08

## 2022-06-20 ASSESSMENT — ENCOUNTER SYMPTOMS
HEADACHES: 0
NERVOUS/ANXIOUS: 0
BREAST MASS: 0
WEAKNESS: 0
DYSURIA: 0
HEMATOCHEZIA: 0
DIZZINESS: 0
SORE THROAT: 0
CONSTIPATION: 0
ARTHRALGIAS: 0
FREQUENCY: 0
SHORTNESS OF BREATH: 0
HEMATURIA: 0
HEARTBURN: 0
MYALGIAS: 0
PALPITATIONS: 0
JOINT SWELLING: 0
PARESTHESIAS: 0
FEVER: 0
CHILLS: 0
DIARRHEA: 0
NAUSEA: 0
ABDOMINAL PAIN: 0
COUGH: 0
EYE PAIN: 0

## 2022-06-20 ASSESSMENT — ACTIVITIES OF DAILY LIVING (ADL): CURRENT_FUNCTION: NO ASSISTANCE NEEDED

## 2022-06-23 ENCOUNTER — OFFICE VISIT (OUTPATIENT)
Dept: FAMILY MEDICINE | Facility: CLINIC | Age: 67
End: 2022-06-23
Payer: MEDICARE

## 2022-06-23 VITALS
DIASTOLIC BLOOD PRESSURE: 75 MMHG | SYSTOLIC BLOOD PRESSURE: 131 MMHG | OXYGEN SATURATION: 99 % | BODY MASS INDEX: 24.67 KG/M2 | TEMPERATURE: 97.1 F | HEIGHT: 67 IN | HEART RATE: 64 BPM | WEIGHT: 157.2 LBS

## 2022-06-23 DIAGNOSIS — Z23 ENCOUNTER FOR IMMUNIZATION: ICD-10-CM

## 2022-06-23 DIAGNOSIS — Z13.6 CARDIOVASCULAR SCREENING; LDL GOAL LESS THAN 100: ICD-10-CM

## 2022-06-23 DIAGNOSIS — I10 HYPERTENSION GOAL BP (BLOOD PRESSURE) < 140/90: ICD-10-CM

## 2022-06-23 DIAGNOSIS — Z78.0 POST-MENOPAUSAL: ICD-10-CM

## 2022-06-23 DIAGNOSIS — Z00.00 ENCOUNTER FOR MEDICARE ANNUAL WELLNESS EXAM: Primary | ICD-10-CM

## 2022-06-23 LAB
CREAT UR-MCNC: 28 MG/DL
MICROALBUMIN UR-MCNC: <5 MG/L
MICROALBUMIN/CREAT UR: NORMAL MG/G{CREAT}

## 2022-06-23 PROCEDURE — 80061 LIPID PANEL: CPT | Performed by: NURSE PRACTITIONER

## 2022-06-23 PROCEDURE — 36415 COLL VENOUS BLD VENIPUNCTURE: CPT | Performed by: NURSE PRACTITIONER

## 2022-06-23 PROCEDURE — 80048 BASIC METABOLIC PNL TOTAL CA: CPT | Performed by: NURSE PRACTITIONER

## 2022-06-23 PROCEDURE — 82043 UR ALBUMIN QUANTITATIVE: CPT | Performed by: NURSE PRACTITIONER

## 2022-06-23 PROCEDURE — 90677 PCV20 VACCINE IM: CPT | Performed by: NURSE PRACTITIONER

## 2022-06-23 PROCEDURE — G0009 ADMIN PNEUMOCOCCAL VACCINE: HCPCS | Performed by: NURSE PRACTITIONER

## 2022-06-23 PROCEDURE — G0438 PPPS, INITIAL VISIT: HCPCS | Performed by: NURSE PRACTITIONER

## 2022-06-23 ASSESSMENT — ENCOUNTER SYMPTOMS
NAUSEA: 0
FEVER: 0
BREAST MASS: 0
EYE PAIN: 0
ABDOMINAL PAIN: 0
WEAKNESS: 0
CHILLS: 0
FREQUENCY: 0
SORE THROAT: 0
DIZZINESS: 0
PALPITATIONS: 0
HEADACHES: 0
JOINT SWELLING: 0
DIARRHEA: 0
ARTHRALGIAS: 0
HEMATURIA: 0
DYSURIA: 0
CONSTIPATION: 0
MYALGIAS: 0
PARESTHESIAS: 0
COUGH: 0
NERVOUS/ANXIOUS: 0
HEMATOCHEZIA: 0
SHORTNESS OF BREATH: 0
HEARTBURN: 0

## 2022-06-23 ASSESSMENT — ACTIVITIES OF DAILY LIVING (ADL): CURRENT_FUNCTION: NO ASSISTANCE NEEDED

## 2022-06-23 NOTE — PATIENT INSTRUCTIONS
Patient Education   Personalized Prevention Plan  You are due for the preventive services outlined below.  Your care team is available to assist you in scheduling these services.  If you have already completed any of these items, please share that information with your care team to update in your medical record.  Health Maintenance Due   Topic Date Due     ANNUAL REVIEW OF HM ORDERS  Never done     Zoster (Shingles) Vaccine (2 of 3) 04/16/2015     Discuss Advance Care Planning  07/13/2017     Annual Wellness Visit  11/04/2019     Pneumococcal Vaccine (2 - PPSV23 or PCV20) 09/08/2021     Osteoporosis Screening  10/19/2021

## 2022-06-23 NOTE — PROGRESS NOTES
"  SUBJECTIVE:   Home Malloy is a 66 year old female who presents for Preventive Visit.      Patient has been advised of split billing requirements and indicates understanding: Yes  Are you in the first 12 months of your Medicare coverage?  No    Healthy Habits:     In general, how would you rate your overall health?  Good    Frequency of exercise:  6-7 days/week    Duration of exercise:  30-45 minutes    Do you usually eat at least 4 servings of fruit and vegetables a day, include whole grains    & fiber and avoid regularly eating high fat or \"junk\" foods?  Yes    Taking medications regularly:  Yes    Medication side effects:  None    Ability to successfully perform activities of daily living:  No assistance needed    Home Safety:  No safety concerns identified    Hearing Impairment:  No hearing concerns    In the past 6 months, have you been bothered by leaking of urine?  No    In general, how would you rate your overall mental or emotional health?  Excellent      PHQ-2 Total Score: 0    Additional concerns today:  Yes    Do you feel safe in your environment? Yes    Have you ever done Advance Care Planning? (For example, a Health Directive, POLST, or a discussion with a medical provider or your loved ones about your wishes): yes Pt planning on bring one in.     Fall risk  Fallen 2 or more times in the past year?: No  Any fall with injury in the past year?: Yes  Timed Up and Go Test (>13.5 is fall risk; contact physician) : 11  click delete button to remove this line now  Cognitive Screening   1) Repeat 3 items (Leader, Season, Table)    2) Clock draw: NORMAL  3) 3 item recall: Recalls 3 objects  Results: 3 items recalled: COGNITIVE IMPAIRMENT LESS LIKELY    Mini-CogTM Copyright DIONICIO Tran. Licensed by the author for use in [facility]; reprinted with permission (adam@.[facility]). All rights reserved.      Do you have sleep apnea, excessive snoring or daytime drowsiness?: no    Reviewed and updated as " needed this visit by clinical staff   Tobacco  Allergies    Med Hx  Surg Hx  Fam Hx  Soc Hx          Reviewed and updated as needed this visit by Provider                   Social History     Tobacco Use     Smoking status: Never Smoker     Smokeless tobacco: Never Used   Substance Use Topics     Alcohol use: No         Alcohol Use 2022   Prescreen: >3 drinks/day or >7 drinks/week? Not Applicable           PROBLEMS TO ADD ON...  Left ankle clicking- not painful.  Starting to happen more, with every step.       Current providers sharing in care for this patient include:   Patient Care Team:  Sarah Mosley APRN CNP as PCP - General (Nurse Practitioner Primary Care)  Carolina Page APRN CNP as Assigned PCP    The following health maintenance items are reviewed in Epic and correct as of today:  Health Maintenance Due   Topic Date Due     ZOSTER IMMUNIZATION (2 of 3) 2015     ADVANCE CARE PLANNING  2017     DEXA  10/19/2021     Lab work is in process  Mammogram Screenin sisters with breast cancer, would like to continue annual screen.    FHS-7:   Breast CA Risk Assessment (FHS-7) 10/25/2021 2022   Did any of your first-degree relatives have breast or ovarian cancer? Yes Yes   Did any of your relatives have bilateral breast cancer? No Unknown   Did any man in your family have breast cancer? No Yes   Did any woman in your family have breast and ovarian cancer? No Yes   Did any woman in your family have breast cancer before age 50 y? No No   Do you have 2 or more relatives with breast and/or ovarian cancer? No Yes   Do you have 2 or more relatives with breast and/or bowel cancer? No No       Mammogram Screening - Alternate mammogram schedule due to breast cancer history- 2 sisters with breast cancer.  Pertinent mammograms are reviewed under the imaging tab.    Review of Systems   Constitutional: Negative for chills and fever.   HENT: Negative for congestion, ear pain, hearing loss and  "sore throat.    Eyes: Negative for pain and visual disturbance.   Respiratory: Negative for cough and shortness of breath.    Cardiovascular: Negative for chest pain, palpitations and peripheral edema.   Gastrointestinal: Negative for abdominal pain, constipation, diarrhea, heartburn, hematochezia and nausea.   Breasts:  Negative for tenderness, breast mass and discharge.   Genitourinary: Negative for dysuria, frequency, genital sores, hematuria, pelvic pain, urgency, vaginal bleeding and vaginal discharge.   Musculoskeletal: Negative for arthralgias, joint swelling and myalgias.   Skin: Negative for rash.   Neurological: Negative for dizziness, weakness, headaches and paresthesias.   Psychiatric/Behavioral: Negative for mood changes. The patient is not nervous/anxious.          OBJECTIVE:   /75 (BP Location: Left arm, Patient Position: Sitting, Cuff Size: Adult Regular)   Pulse 64   Temp 97.1  F (36.2  C) (Temporal)   Ht 1.702 m (5' 7\")   Wt 71.3 kg (157 lb 3.2 oz)   SpO2 99%   BMI 24.62 kg/m   Estimated body mass index is 24.62 kg/m  as calculated from the following:    Height as of this encounter: 1.702 m (5' 7\").    Weight as of this encounter: 71.3 kg (157 lb 3.2 oz).  Physical Exam  GENERAL APPEARANCE: healthy, alert and no distress  EYES: Eyes grossly normal to inspection, PERRL and conjunctivae and sclerae normal  HENT: ear canals and TM's normal, nose and mouth without ulcers or lesions, oropharynx clear and oral mucous membranes moist  NECK: no adenopathy, no asymmetry, masses, or scars and thyroid normal to palpation  RESP: lungs clear to auscultation - no rales, rhonchi or wheezes  CV: regular rate and rhythm, normal S1 S2, no S3 or S4, no murmur, click or rub, no peripheral edema and peripheral pulses strong  ABDOMEN: soft, nontender, no hepatosplenomegaly, no masses and bowel sounds normal  MS: no musculoskeletal defects are noted and gait is age appropriate without ataxia  SKIN: no " "suspicious lesions or rashes  NEURO: Normal strength and tone, sensory exam grossly normal, mentation intact and speech normal  PSYCH: mentation appears normal and affect normal/bright        ASSESSMENT / PLAN:   (Z00.00) Encounter for Medicare annual wellness exam  (primary encounter diagnosis)  Comment: Reviewed medical history and health maintenance  Plan:     (Z78.0) Post-menopausal  Comment:   Plan: DEXA HIP/PELVIS/SPINE - Future            (I10) Hypertension goal BP (blood pressure) < 140/90  Comment:  Stable, tolerating med, no changes at this time  Plan: Albumin Random Urine Quantitative with Creat         Ratio, Basic metabolic panel  (Ca, Cl, CO2,         Creat, Gluc, K, Na, BUN)            (Z13.6) CARDIOVASCULAR SCREENING; LDL GOAL LESS THAN 100  Comment:   Plan: Lipid panel reflex to direct LDL Fasting            (Z23) Encounter for immunization  Comment:   Plan: Pneumococcal 20 Valent Conjugate (PCV20),         CANCELED: Pneumococcal 20 Valent Conjugate         (PCV20)              Patient has been advised of split billing requirements and indicates understanding: Yes    COUNSELING:  Reviewed preventive health counseling, as reflected in patient instructions    Estimated body mass index is 24.62 kg/m  as calculated from the following:    Height as of this encounter: 1.702 m (5' 7\").    Weight as of this encounter: 71.3 kg (157 lb 3.2 oz).        She reports that she has never smoked. She has never used smokeless tobacco.      Appropriate preventive services were discussed with this patient, including applicable screening as appropriate for cardiovascular disease, diabetes, osteopenia/osteoporosis, and glaucoma.  As appropriate for age/gender, discussed screening for colorectal cancer, prostate cancer, breast cancer, and cervical cancer. Checklist reviewing preventive services available has been given to the patient.    Reviewed patients plan of care and provided an AVS. The Basic Care Plan (routine " screening as documented in Health Maintenance) for Home meets the Care Plan requirement. This Care Plan has been established and reviewed with the Patient.    Counseling Resources:  ATP IV Guidelines  Pooled Cohorts Equation Calculator  Breast Cancer Risk Calculator  Breast Cancer: Medication to Reduce Risk  FRAX Risk Assessment  ICSI Preventive Guidelines  Dietary Guidelines for Americans, 2010  USDA's MyPlate  ASA Prophylaxis  Lung CA Screening    TIN Phillip [facility]    Identified Health Risks:

## 2022-06-23 NOTE — NURSING NOTE
Prior to immunization administration, verified patients identity using patient s name and date of birth. Please see Immunization Activity for additional information.     Screening Questionnaire for Adult Immunization    Are you sick today?   No   Do you have allergies to medications, food, a vaccine component or latex?   No   Have you ever had a serious reaction after receiving a vaccination?   No   Do you have a long-term health problem with heart, lung, kidney, or metabolic disease (e.g., diabetes), asthma, a blood disorder, no spleen, complement component deficiency, a cochlear implant, or a spinal fluid leak?  Are you on long-term aspirin therapy?   No   Do you have cancer, leukemia, HIV/AIDS, or any other immune system problem?   No   Do you have a parent, brother, or sister with an immune system problem?   No   In the past 3 months, have you taken medications that affect  your immune system, such as prednisone, other steroids, or anticancer drugs; drugs for the treatment of rheumatoid arthritis, Crohn s disease, or psoriasis; or have you had radiation treatments?   No   Have you had a seizure, or a brain or other nervous system problem?   No   During the past year, have you received a transfusion of blood or blood    products, or been given immune (gamma) globulin or antiviral drug?   No   For women: Are you pregnant or is there a chance you could become       pregnant during the next month?   No   Have you received any vaccinations in the past 4 weeks?   No     Immunization questionnaire answers were all negative.        Per orders of Dr. Mosley, injection of Prevnar 20 given by Cynthia James MA. Patient instructed to remain in clinic for 15 minutes afterwards, and to report any adverse reaction to me immediately.       Screening performed by Cynthia James MA on 6/23/2022 at 10:07 AM.

## 2022-06-25 LAB
ANION GAP SERPL CALCULATED.3IONS-SCNC: 5 MMOL/L (ref 3–14)
BUN SERPL-MCNC: 13 MG/DL (ref 7–30)
CALCIUM SERPL-MCNC: 10.1 MG/DL (ref 8.5–10.1)
CHLORIDE BLD-SCNC: 98 MMOL/L (ref 94–109)
CHOLEST SERPL-MCNC: 156 MG/DL
CO2 SERPL-SCNC: 28 MMOL/L (ref 20–32)
CREAT SERPL-MCNC: 0.54 MG/DL (ref 0.52–1.04)
FASTING STATUS PATIENT QL REPORTED: YES
GFR SERPL CREATININE-BSD FRML MDRD: >90 ML/MIN/1.73M2
GLUCOSE BLD-MCNC: 108 MG/DL (ref 70–99)
HDLC SERPL-MCNC: 61 MG/DL
LDLC SERPL CALC-MCNC: 76 MG/DL
NONHDLC SERPL-MCNC: 95 MG/DL
POTASSIUM BLD-SCNC: 4.7 MMOL/L (ref 3.4–5.3)
SODIUM SERPL-SCNC: 131 MMOL/L (ref 133–144)
TRIGL SERPL-MCNC: 96 MG/DL

## 2022-07-12 ENCOUNTER — ANCILLARY PROCEDURE (OUTPATIENT)
Dept: BONE DENSITY | Facility: CLINIC | Age: 67
End: 2022-07-12
Attending: NURSE PRACTITIONER
Payer: MEDICARE

## 2022-07-12 DIAGNOSIS — Z78.0 POST-MENOPAUSAL: ICD-10-CM

## 2022-07-12 PROCEDURE — 77085 DXA BONE DENSITY AXL VRT FX: CPT | Performed by: INTERNAL MEDICINE

## 2022-07-25 DIAGNOSIS — I10 HYPERTENSION GOAL BP (BLOOD PRESSURE) < 140/90: ICD-10-CM

## 2022-07-27 RX ORDER — AMLODIPINE BESYLATE 5 MG/1
TABLET ORAL
Qty: 90 TABLET | Refills: 3 | Status: SHIPPED | OUTPATIENT
Start: 2022-07-27 | End: 2023-02-01

## 2022-07-27 NOTE — TELEPHONE ENCOUNTER
---Prescription approved per [facility] Refill Protocol.       Lizz Gold RN BSN     [facility]      --Last visit:  6/23/2022 [facility].

## 2022-10-23 DIAGNOSIS — I10 HYPERTENSION GOAL BP (BLOOD PRESSURE) < 140/90: ICD-10-CM

## 2022-10-24 RX ORDER — LOSARTAN POTASSIUM AND HYDROCHLOROTHIAZIDE 25; 100 MG/1; MG/1
TABLET ORAL
Qty: 90 TABLET | Refills: 3 | Status: SHIPPED | OUTPATIENT
Start: 2022-10-24 | End: 2023-02-01

## 2022-10-24 NOTE — TELEPHONE ENCOUNTER
Does not pass the RN protocol:  Last filled:  losartan-hydrochlorothiazide (HYZAAR) 100-25 MG tablet 90 tablet 3 9/13/2021     Last visit: 6/23/2022 by you    Diuretics (Including Combos) Protocol Failed 10/23/2022 11:56 PM   Protocol Details  Normal serum sodium on file in past 12 months    Blood pressure under 140/90 in past 12 months    Recent (12 mo) or future (30 days) visit within the authorizing provider's specialty    Medication is active on med list    Patient is age 18 or older    No active pregancy on record    Normal serum creatinine on file in past 12 months    Normal serum potassium on file in past 12 months    No positive pregnancy test in past 12 months   Angiotensin-II Receptors Passed   Protocol Details  Last blood pressure under 140/90 in past 12 months    Recent (12 mo) or future (30 days) visit within the authorizing provider's specialty    Medication is active on med list    Patient is age 18 or older    No active pregnancy on record    Normal serum creatinine on file in past 12 months    Normal serum potassium on file in past 12 months    No positive pregnancy test in past 12 months

## 2022-11-23 ENCOUNTER — ANCILLARY PROCEDURE (OUTPATIENT)
Dept: MAMMOGRAPHY | Facility: CLINIC | Age: 67
End: 2022-11-23
Attending: NURSE PRACTITIONER
Payer: MEDICARE

## 2022-11-23 DIAGNOSIS — Z12.31 VISIT FOR SCREENING MAMMOGRAM: ICD-10-CM

## 2022-11-23 PROCEDURE — 77067 SCR MAMMO BI INCL CAD: CPT | Mod: TC | Performed by: RADIOLOGY

## 2022-11-23 PROCEDURE — 77063 BREAST TOMOSYNTHESIS BI: CPT | Mod: TC | Performed by: RADIOLOGY

## 2022-12-06 ENCOUNTER — TRANSFERRED RECORDS (OUTPATIENT)
Dept: HEALTH INFORMATION MANAGEMENT | Facility: CLINIC | Age: 67
End: 2022-12-06

## 2023-02-01 ENCOUNTER — TELEPHONE (OUTPATIENT)
Dept: FAMILY MEDICINE | Facility: CLINIC | Age: 68
End: 2023-02-01
Payer: MEDICARE

## 2023-02-01 DIAGNOSIS — I10 HYPERTENSION GOAL BP (BLOOD PRESSURE) < 140/90: ICD-10-CM

## 2023-02-01 RX ORDER — LOSARTAN POTASSIUM AND HYDROCHLOROTHIAZIDE 25; 100 MG/1; MG/1
1 TABLET ORAL DAILY
Qty: 90 TABLET | Refills: 1 | Status: SHIPPED | OUTPATIENT
Start: 2023-02-01 | End: 2023-07-25

## 2023-02-01 RX ORDER — AMLODIPINE BESYLATE 5 MG/1
5 TABLET ORAL DAILY
Qty: 90 TABLET | Refills: 1 | Status: SHIPPED | OUTPATIENT
Start: 2023-02-01 | End: 2023-07-25

## 2023-02-01 NOTE — TELEPHONE ENCOUNTER
Medication Question or Refill    Contacts       Type Contact Phone/Fax    02/01/2023 10:50 AM CST Phone (Incoming) Home Malloy (Self) [phone] (M)          What medication are you calling about (include dose and sig)?: Losartan and amlodipine    Preferred Pharmacy:     [id number] - [place], [place] - [address] AT [facility] [address]  [address] [place] [address]  Phone: [phone] Fax: [phone]      Controlled Substance Agreement on file:   CSA -- Patient Level:    CSA: None found at the patient level.       Who prescribed the medication?:     Do you need a refill? Yes    When did you use the medication last? Today    Patient offered an appointment? No    Do you have any questions or concerns?  No

## 2023-02-01 NOTE — TELEPHONE ENCOUNTER
Sent remaining rx to the new pharmacy for both rx.  LM to inform pt this was done.  MYRA Martinez

## 2023-07-20 ASSESSMENT — ENCOUNTER SYMPTOMS
JOINT SWELLING: 0
BREAST MASS: 0
DIARRHEA: 0
HEMATOCHEZIA: 0
EYE PAIN: 0
ABDOMINAL PAIN: 0
NERVOUS/ANXIOUS: 0
FREQUENCY: 0
SORE THROAT: 0
HEADACHES: 0
DIZZINESS: 0
PARESTHESIAS: 0
HEARTBURN: 0
CHILLS: 0
ARTHRALGIAS: 1
DYSURIA: 0
CONSTIPATION: 1
PALPITATIONS: 0
NAUSEA: 0
COUGH: 0
MYALGIAS: 0
FEVER: 0
SHORTNESS OF BREATH: 0
HEMATURIA: 0
WEAKNESS: 0

## 2023-07-20 ASSESSMENT — ACTIVITIES OF DAILY LIVING (ADL): CURRENT_FUNCTION: NO ASSISTANCE NEEDED

## 2023-07-25 ENCOUNTER — OFFICE VISIT (OUTPATIENT)
Dept: FAMILY MEDICINE | Facility: CLINIC | Age: 68
End: 2023-07-25
Payer: MEDICARE

## 2023-07-25 VITALS
RESPIRATION RATE: 15 BRPM | BODY MASS INDEX: 25.39 KG/M2 | OXYGEN SATURATION: 100 % | SYSTOLIC BLOOD PRESSURE: 125 MMHG | HEIGHT: 66 IN | DIASTOLIC BLOOD PRESSURE: 71 MMHG | HEART RATE: 62 BPM | TEMPERATURE: 97.9 F | WEIGHT: 158 LBS

## 2023-07-25 DIAGNOSIS — Z23 NEED FOR SHINGLES VACCINE: ICD-10-CM

## 2023-07-25 DIAGNOSIS — I10 HYPERTENSION GOAL BP (BLOOD PRESSURE) < 140/90: ICD-10-CM

## 2023-07-25 DIAGNOSIS — Z13.220 LIPID SCREENING: ICD-10-CM

## 2023-07-25 DIAGNOSIS — Z00.00 ENCOUNTER FOR MEDICARE ANNUAL WELLNESS EXAM: Primary | ICD-10-CM

## 2023-07-25 LAB
ANION GAP SERPL CALCULATED.3IONS-SCNC: 9 MMOL/L (ref 7–15)
BUN SERPL-MCNC: 14.2 MG/DL (ref 8–23)
CALCIUM SERPL-MCNC: 10.4 MG/DL (ref 8.8–10.2)
CHLORIDE SERPL-SCNC: 96 MMOL/L (ref 98–107)
CHOLEST SERPL-MCNC: 149 MG/DL
CREAT SERPL-MCNC: 0.69 MG/DL (ref 0.51–0.95)
CREAT UR-MCNC: 41.8 MG/DL
DEPRECATED HCO3 PLAS-SCNC: 27 MMOL/L (ref 22–29)
GFR SERPL CREATININE-BSD FRML MDRD: >90 ML/MIN/1.73M2
GLUCOSE SERPL-MCNC: 108 MG/DL (ref 70–99)
HDLC SERPL-MCNC: 58 MG/DL
LDLC SERPL CALC-MCNC: 74 MG/DL
MICROALBUMIN UR-MCNC: <12 MG/L
MICROALBUMIN/CREAT UR: NORMAL MG/G{CREAT}
NONHDLC SERPL-MCNC: 91 MG/DL
POTASSIUM SERPL-SCNC: 5.1 MMOL/L (ref 3.4–5.3)
SODIUM SERPL-SCNC: 132 MMOL/L (ref 136–145)
TRIGL SERPL-MCNC: 86 MG/DL

## 2023-07-25 PROCEDURE — G0439 PPPS, SUBSEQ VISIT: HCPCS | Performed by: NURSE PRACTITIONER

## 2023-07-25 PROCEDURE — 99213 OFFICE O/P EST LOW 20 MIN: CPT | Mod: 25 | Performed by: NURSE PRACTITIONER

## 2023-07-25 PROCEDURE — 80048 BASIC METABOLIC PNL TOTAL CA: CPT | Performed by: NURSE PRACTITIONER

## 2023-07-25 PROCEDURE — 82043 UR ALBUMIN QUANTITATIVE: CPT | Performed by: NURSE PRACTITIONER

## 2023-07-25 PROCEDURE — 82570 ASSAY OF URINE CREATININE: CPT | Performed by: NURSE PRACTITIONER

## 2023-07-25 PROCEDURE — 80061 LIPID PANEL: CPT | Performed by: NURSE PRACTITIONER

## 2023-07-25 PROCEDURE — 36415 COLL VENOUS BLD VENIPUNCTURE: CPT | Performed by: NURSE PRACTITIONER

## 2023-07-25 RX ORDER — LOSARTAN POTASSIUM AND HYDROCHLOROTHIAZIDE 25; 100 MG/1; MG/1
1 TABLET ORAL DAILY
Qty: 90 TABLET | Refills: 3 | Status: SHIPPED | OUTPATIENT
Start: 2023-07-25 | End: 2024-07-21

## 2023-07-25 RX ORDER — AMLODIPINE BESYLATE 5 MG/1
5 TABLET ORAL DAILY
Qty: 90 TABLET | Refills: 3 | Status: SHIPPED | OUTPATIENT
Start: 2023-07-25 | End: 2024-07-21

## 2023-07-25 ASSESSMENT — ENCOUNTER SYMPTOMS
HEARTBURN: 0
CHILLS: 0
FEVER: 0
DIZZINESS: 0
FREQUENCY: 0
ABDOMINAL PAIN: 0
HEMATOCHEZIA: 0
NAUSEA: 0
PALPITATIONS: 0
NERVOUS/ANXIOUS: 0
COUGH: 0
JOINT SWELLING: 0
HEADACHES: 0
DIARRHEA: 0
EYE PAIN: 0
SHORTNESS OF BREATH: 0
CONSTIPATION: 1
WEAKNESS: 0
SORE THROAT: 0
DYSURIA: 0
HEMATURIA: 0
ARTHRALGIAS: 1
MYALGIAS: 0
PARESTHESIAS: 0
BREAST MASS: 0

## 2023-07-25 ASSESSMENT — ACTIVITIES OF DAILY LIVING (ADL): CURRENT_FUNCTION: NO ASSISTANCE NEEDED

## 2023-07-25 ASSESSMENT — PAIN SCALES - GENERAL: PAINLEVEL: NO PAIN (0)

## 2023-07-25 NOTE — PATIENT INSTRUCTIONS
Patient Education   Personalized Prevention Plan  You are due for the preventive services outlined below.  Your care team is available to assist you in scheduling these services.  If you have already completed any of these items, please share that information with your care team to update in your medical record.  Health Maintenance Due   Topic Date Due     Zoster (Shingles) Vaccine (2 of 3) 04/16/2015     COVID-19 Vaccine (5 - Additional dose for Acacia series) 02/10/2023     Annual Wellness Visit  06/23/2023     Basic Metabolic Panel  06/23/2023     Kidney Microalbumin Urine Test  06/23/2023

## 2023-07-25 NOTE — PROGRESS NOTES
"SUBJECTIVE:   Home is a 67 year old who presents for Preventive Visit.      7/25/2023     8:17 AM   Additional Questions   Roomed by Brisa Abdalla         7/25/2023     8:18 AM   Patient Reported Additional Medications   Patient reports taking the following new medications Add fluticasone - PRN     Are you in the first 12 months of your Medicare coverage?  No    Healthy Habits:     In general, how would you rate your overall health?  Excellent    Frequency of exercise:  6-7 days/week    Duration of exercise:  45-60 minutes    Do you usually eat at least 4 servings of fruit and vegetables a day, include whole grains    & fiber and avoid regularly eating high fat or \"junk\" foods?  Yes    Taking medications regularly:  Yes    Medication side effects:  None    Ability to successfully perform activities of daily living:  No assistance needed    Home Safety:  No safety concerns identified    Hearing Impairment:  No hearing concerns    In the past 6 months, have you been bothered by leaking of urine?  No    In general, how would you rate your overall mental or emotional health?  Excellent    Additional concerns today:  Yes        Have you ever done Advance Care Planning? (For example, a Health Directive, POLST, or a discussion with a medical provider or your loved ones about your wishes): No, advance care planning information given to patient to review.  Patient declined advance care planning discussion at this time.       Fall risk  Fallen 2 or more times in the past year?: No  Any fall with injury in the past year?: No  click delete button to remove this line now  Cognitive Screening   1) Repeat 3 items (Leader, Season, Table)    2) Clock draw: NORMAL  3) 3 item recall: Recalls 3 objects  Results: 3 items recalled: COGNITIVE IMPAIRMENT LESS LIKELY    Mini-CogTM Copyright S Marc. Licensed by the author for use in [facility]; reprinted with permission (adam@.[email or web address]). All rights reserved.          Reviewed and " updated as needed this visit by clinical staff   Tobacco  Allergies  Meds              Reviewed and updated as needed this visit by Provider                 Social History     Tobacco Use    Smoking status: Never    Smokeless tobacco: Never   Substance Use Topics    Alcohol use: No             7/20/2023     1:24 PM   Alcohol Use   Prescreen: >3 drinks/day or >7 drinks/week? Not Applicable          No data to display              Do you have a current opioid prescription? No  Do you use any other controlled substances or medications that are not prescribed by a provider? None        Current providers sharing in care for this patient include:   Patient Care Team:  Sarah Mosley APRN CNP as PCP - General (Nurse Practitioner Primary Care)  Sarah Mosley APRN CNP as Assigned PCP    The following health maintenance items are reviewed in Epic and correct as of today:  Health Maintenance   Topic Date Due    ZOSTER IMMUNIZATION (2 of 3) 04/16/2015    ADVANCE CARE PLANNING  07/13/2017    COVID-19 Vaccine (5 - Additional dose for Acacia series) 02/10/2023    MEDICARE ANNUAL WELLNESS VISIT  06/23/2023    BMP  06/23/2023    MICROALBUMIN  06/23/2023    ANNUAL REVIEW OF HM ORDERS  06/23/2023    INFLUENZA VACCINE (1) 09/01/2023    MAMMO SCREENING  11/23/2023    FALL RISK ASSESSMENT  07/25/2024    DEXA  07/12/2025    LIPID  06/23/2027    COLORECTAL CANCER SCREENING  06/05/2029    DTAP/TDAP/TD IMMUNIZATION (4 - Td or Tdap) 09/08/2030    HEPATITIS C SCREENING  Completed    PHQ-2 (once per calendar year)  Completed    Pneumococcal Vaccine: 65+ Years  Completed    IPV IMMUNIZATION  Aged Out    MENINGITIS IMMUNIZATION  Aged Out     Lab work is in process      FHS-7:       10/25/2021     3:03 PM 6/20/2022    10:11 AM 11/23/2022     9:07 AM 7/20/2023     1:26 PM   Breast CA Risk Assessment (FHS-7)   Did any of your first-degree relatives have breast or ovarian cancer? Yes Yes Yes Yes   Did any of your relatives have bilateral  "breast cancer? No Unknown No No   Did any man in your family have breast cancer? No Yes No No   Did any woman in your family have breast and ovarian cancer? No Yes Yes No   Did any woman in your family have breast cancer before age 50 y? No No Yes No   Do you have 2 or more relatives with breast and/or ovarian cancer? No Yes Yes Yes   Do you have 2 or more relatives with breast and/or bowel cancer? No No Yes No       Mammogram Screening: Recommended mammography every 1-2 years with patient discussion and risk factor consideration  Pertinent mammograms are reviewed under the imaging tab.    Review of Systems   Constitutional:  Negative for chills and fever.   HENT:  Negative for congestion, ear pain, hearing loss and sore throat.    Eyes:  Negative for pain and visual disturbance.   Respiratory:  Negative for cough and shortness of breath.    Cardiovascular:  Negative for chest pain, palpitations and peripheral edema.   Gastrointestinal:  Positive for constipation. Negative for abdominal pain, diarrhea, heartburn, hematochezia and nausea.   Breasts:  Negative for tenderness, breast mass and discharge.   Genitourinary:  Negative for dysuria, frequency, genital sores, hematuria, pelvic pain, urgency, vaginal bleeding and vaginal discharge.   Musculoskeletal:  Positive for arthralgias. Negative for joint swelling and myalgias.   Skin:  Negative for rash.   Neurological:  Negative for dizziness, weakness, headaches and paresthesias.   Psychiatric/Behavioral:  Negative for mood changes. The patient is not nervous/anxious.          OBJECTIVE:   /71 (BP Location: Right arm, Patient Position: Sitting, Cuff Size: Adult Regular)   Pulse 62   Temp 97.9  F (36.6  C) (Temporal)   Resp 15   Ht 1.676 m (5' 6\")   Wt 71.7 kg (158 lb)   SpO2 100%   BMI 25.50 kg/m   Estimated body mass index is 25.5 kg/m  as calculated from the following:    Height as of this encounter: 1.676 m (5' 6\").    Weight as of this encounter: 71.7 " kg (158 lb).  Physical Exam  GENERAL: healthy, alert and no distress  EYES: Eyes grossly normal to inspection, PERRL and conjunctivae and sclerae normal  HENT: ear canals and TM's normal, nose and mouth without ulcers or lesions  NECK: no adenopathy, no asymmetry, masses, or scars and thyroid normal to palpation  RESP: lungs clear to auscultation - no rales, rhonchi or wheezes  CV: regular rate and rhythm, normal S1 S2, no S3 or S4, no murmur, click or rub, no peripheral edema and peripheral pulses strong  ABDOMEN: soft, nontender, no hepatosplenomegaly, no masses and bowel sounds normal  MS: no gross musculoskeletal defects noted, no edema        ASSESSMENT / PLAN:   (Z00.00) Encounter for Medicare annual wellness exam  (primary encounter diagnosis)  Comment: Reviewed medical/social/family history and health maintenance   Plan:     (I10) Hypertension goal BP (blood pressure) < 140/90  Comment:  Stable, tolerating med, no changes at this time.  Refills provided.  Plan: BASIC METABOLIC PANEL, Albumin Random Urine         Quantitative with Creat Ratio, amLODIPine         (NORVASC) 5 MG tablet,         losartan-hydrochlorothiazide (HYZAAR) 100-25 MG        tablet            (Z23) Need for shingles vaccine  Comment: Recommended through the pharmacy for medicare patient.   Plan:     (Z13.220) Lipid screening  Comment:   Plan: Lipid panel reflex to direct LDL Fasting            Patient has been advised of split billing requirements and indicates understanding: Yes      COUNSELING:  Reviewed preventive health counseling, as reflected in patient instructions        She reports that she has never smoked. She has never used smokeless tobacco.      Appropriate preventive services were discussed with this patient, including applicable screening as appropriate for cardiovascular disease, diabetes, osteopenia/osteoporosis, and glaucoma.  As appropriate for age/gender, discussed screening for colorectal cancer, prostate cancer,  breast cancer, and cervical cancer. Checklist reviewing preventive services available has been given to the patient.    Reviewed patients plan of care and provided an AVS. The Basic Care Plan (routine screening as documented in Health Maintenance) for Home meets the Care Plan requirement. This Care Plan has been established and reviewed with the Patient.          TIN Phillip [facility]    Identified Health Risks:

## 2023-07-27 DIAGNOSIS — I10 HYPERTENSION GOAL BP (BLOOD PRESSURE) < 140/90: ICD-10-CM

## 2023-07-28 ENCOUNTER — MYC MEDICAL ADVICE (OUTPATIENT)
Dept: FAMILY MEDICINE | Facility: CLINIC | Age: 68
End: 2023-07-28
Payer: MEDICARE

## 2023-07-28 RX ORDER — AMLODIPINE BESYLATE 5 MG/1
TABLET ORAL
Qty: 90 TABLET | Refills: 3 | OUTPATIENT
Start: 2023-07-28

## 2023-07-28 RX ORDER — LOSARTAN POTASSIUM AND HYDROCHLOROTHIAZIDE 25; 100 MG/1; MG/1
1 TABLET ORAL DAILY
Qty: 90 TABLET | Refills: 3 | OUTPATIENT
Start: 2023-07-28

## 2024-02-17 ENCOUNTER — HEALTH MAINTENANCE LETTER (OUTPATIENT)
Age: 69
End: 2024-02-17

## 2024-05-21 ENCOUNTER — PATIENT OUTREACH (OUTPATIENT)
Dept: CARE COORDINATION | Facility: CLINIC | Age: 69
End: 2024-05-21
Payer: MEDICARE

## 2024-06-25 ENCOUNTER — PATIENT OUTREACH (OUTPATIENT)
Dept: CARE COORDINATION | Facility: CLINIC | Age: 69
End: 2024-06-25
Payer: MEDICARE

## 2024-07-10 ENCOUNTER — OFFICE VISIT (OUTPATIENT)
Dept: URGENT CARE | Facility: URGENT CARE | Age: 69
End: 2024-07-10
Payer: MEDICARE

## 2024-07-10 DIAGNOSIS — S61.032A PUNCTURE WOUND OF LEFT THUMB, INITIAL ENCOUNTER: Primary | ICD-10-CM

## 2024-07-10 PROCEDURE — 99213 OFFICE O/P EST LOW 20 MIN: CPT | Performed by: NURSE PRACTITIONER

## 2024-07-10 RX ORDER — CEPHALEXIN 500 MG/1
500 CAPSULE ORAL 3 TIMES DAILY
Qty: 15 CAPSULE | Refills: 0 | Status: SHIPPED | OUTPATIENT
Start: 2024-07-10 | End: 2024-07-15

## 2024-07-10 NOTE — PROGRESS NOTES
Chief Complaint   Patient presents with    Urgent Care    Laceration     Patient presents with sticking herself in the left thumb with a screwdriver and is now bleeding.     SUBJECTIVE:  Home Malloy is a 68 year old female presenting with a left thumb laceration that occurred several hours prior.  A screwdriver cut into her hand with a mildly bleeding 3 cm jagged superficial laceration.  This is nongaping without bony tenderness last range of motion numbness tingling pallor or cool sensation.  It was however a dirty wound.  Her tetanus is up-to-date 9/8/2020.    Past Medical History:   Diagnosis Date    Allergic rhinitis     Atopic dermatitis     Cataract     Family history of colon cancer     Family history of colon cancer     Family history of colon cancer     Family history of malignant neoplasm of breast     Family history of malignant neoplasm of breast     Family history of malignant neoplasm of breast     Glaucoma     Hypertension goal BP (blood pressure) < 140/90 7/13/2012    Rosacea      Current Outpatient Medications   Medication Sig Dispense Refill    amLODIPine (NORVASC) 5 MG tablet Take 1 tablet (5 mg) by mouth daily 90 tablet 3    cephALEXin (KEFLEX) 500 MG capsule Take 1 capsule (500 mg) by mouth 3 times daily for 5 days 15 capsule 0    doxycycline hyclate (VIBRAMYCIN) 50 MG capsule Take 50 mg by mouth daily      latanoprost (XALATAN) 0.005 % ophthalmic solution 1 drop At Bedtime.      losartan-hydrochlorothiazide (HYZAAR) 100-25 MG tablet Take 1 tablet by mouth daily 90 tablet 3    metroNIDAZOLE (METROGEL) 1 % external gel Apply 1 g topically daily . Managed by Dermatology.      Multiple Vitamins-Minerals (MULTIVITAL PO) Take by mouth daily      sulfacetamide sodium (Acne) 10 % lotion daily . Managed by dermatology.      timolol (TIMOPTIC-XE) 0.5 % ophthalmic gel-forming        No current facility-administered medications for this visit.     Social History     Tobacco Use    Smoking status:  Never    Smokeless tobacco: Never   Substance Use Topics    Alcohol use: No     Allergies   Allergen Reactions    Nkda [No Known Drug Allergy]        Review of Systems  All systems negative except for those listed above in HPI.    OBJECTIVE:   BP (!) 150/72 (BP Location: Right arm)   Pulse 69   Temp 97.3  F (36.3  C) (Temporal)   Resp 14   SpO2 99%   Physical Exam  Vitals reviewed.   Constitutional:       General: She is not in acute distress.     Appearance: She is well-developed. She is not ill-appearing, toxic-appearing or diaphoretic.   HENT:      Head: Normocephalic and atraumatic.   Eyes:      Conjunctiva/sclera: Conjunctivae normal.      Pupils: Pupils are equal, round, and reactive to light.   Cardiovascular:      Rate and Rhythm: Normal rate.      Pulses: Normal pulses.   Pulmonary:      Effort: Pulmonary effort is normal. No respiratory distress.   Musculoskeletal:         General: Normal range of motion.      Cervical back: Normal range of motion and neck supple.   Skin:     General: Skin is warm.      Capillary Refill: Capillary refill takes less than 2 seconds.      Coloration: Skin is not pale.      Findings: Erythema and lesion present. No rash.   Neurological:      Mental Status: She is alert and oriented to person, place, and time.      Sensory: No sensory deficit.       ASSESSMENT:    ICD-10-CM    1. Puncture wound of left thumb, initial encounter  S61.032A cephALEXin (KEFLEX) 500 MG capsule        PLAN:    Keflex for puncture wound prophylaxis  Area cleansed and bandaged in clinic  Epsom salt warm soapy water and ice packs  Reevaluation if worsening redness, warmth, pus  No bony tenderness, hemodynamically and neurovascularly intact, normal ROM, will hold on x-ray  Tdap done in 2020, within 5 years    Follow up with primary care provider with any problems, questions or concerns or if symptoms worsen or fail to improve. Patient agreed to plan and verbalized understanding.    Greer Lopes,  KAYLAHP-BC  [facility] [place]

## 2024-07-10 NOTE — PATIENT INSTRUCTIONS
Keflex for puncture wound prophylaxis  Area cleansed and bandaged in clinic  Epsom salt warm soapy water and ice packs  Reevaluation if worsening redness, warmth, pus  No bony tenderness, hemodynamically and neurovascularly intact, normal ROM, will hold on x-ray  Tdap done in 2020, within 5 years

## 2024-07-12 VITALS
TEMPERATURE: 97.3 F | RESPIRATION RATE: 14 BRPM | DIASTOLIC BLOOD PRESSURE: 72 MMHG | HEART RATE: 69 BPM | SYSTOLIC BLOOD PRESSURE: 150 MMHG | OXYGEN SATURATION: 99 %

## 2024-07-18 ENCOUNTER — ANCILLARY PROCEDURE (OUTPATIENT)
Dept: MAMMOGRAPHY | Facility: CLINIC | Age: 69
End: 2024-07-18
Attending: NURSE PRACTITIONER
Payer: MEDICARE

## 2024-07-18 DIAGNOSIS — Z12.31 BREAST CANCER SCREENING BY MAMMOGRAM: ICD-10-CM

## 2024-07-18 PROCEDURE — 77067 SCR MAMMO BI INCL CAD: CPT | Mod: TC | Performed by: RADIOLOGY

## 2024-07-18 PROCEDURE — 77063 BREAST TOMOSYNTHESIS BI: CPT | Mod: TC | Performed by: RADIOLOGY

## 2024-07-21 DIAGNOSIS — I10 HYPERTENSION GOAL BP (BLOOD PRESSURE) < 140/90: ICD-10-CM

## 2024-07-21 RX ORDER — AMLODIPINE BESYLATE 5 MG/1
5 TABLET ORAL DAILY
Qty: 90 TABLET | Refills: 0 | Status: SHIPPED | OUTPATIENT
Start: 2024-07-21 | End: 2024-08-13

## 2024-07-21 RX ORDER — LOSARTAN POTASSIUM AND HYDROCHLOROTHIAZIDE 25; 100 MG/1; MG/1
1 TABLET ORAL DAILY
Qty: 90 TABLET | Refills: 0 | Status: SHIPPED | OUTPATIENT
Start: 2024-07-21 | End: 2024-08-13

## 2024-08-12 SDOH — HEALTH STABILITY: PHYSICAL HEALTH: ON AVERAGE, HOW MANY DAYS PER WEEK DO YOU ENGAGE IN MODERATE TO STRENUOUS EXERCISE (LIKE A BRISK WALK)?: 5 DAYS

## 2024-08-12 SDOH — HEALTH STABILITY: PHYSICAL HEALTH: ON AVERAGE, HOW MANY MINUTES DO YOU ENGAGE IN EXERCISE AT THIS LEVEL?: 50 MIN

## 2024-08-12 ASSESSMENT — SOCIAL DETERMINANTS OF HEALTH (SDOH): HOW OFTEN DO YOU GET TOGETHER WITH FRIENDS OR RELATIVES?: THREE TIMES A WEEK

## 2024-08-13 ENCOUNTER — OFFICE VISIT (OUTPATIENT)
Dept: FAMILY MEDICINE | Facility: CLINIC | Age: 69
End: 2024-08-13
Attending: NURSE PRACTITIONER
Payer: MEDICARE

## 2024-08-13 VITALS
HEART RATE: 68 BPM | BODY MASS INDEX: 25.22 KG/M2 | TEMPERATURE: 98.8 F | SYSTOLIC BLOOD PRESSURE: 132 MMHG | DIASTOLIC BLOOD PRESSURE: 78 MMHG | RESPIRATION RATE: 21 BRPM | HEIGHT: 66 IN | OXYGEN SATURATION: 99 % | WEIGHT: 156.9 LBS

## 2024-08-13 DIAGNOSIS — Z13.220 LIPID SCREENING: ICD-10-CM

## 2024-08-13 DIAGNOSIS — Z23 NEED FOR SHINGLES VACCINE: ICD-10-CM

## 2024-08-13 DIAGNOSIS — I10 HYPERTENSION GOAL BP (BLOOD PRESSURE) < 140/90: ICD-10-CM

## 2024-08-13 DIAGNOSIS — Z13.0 SCREENING FOR IRON DEFICIENCY ANEMIA: ICD-10-CM

## 2024-08-13 DIAGNOSIS — Z00.00 ENCOUNTER FOR MEDICARE ANNUAL WELLNESS EXAM: Primary | ICD-10-CM

## 2024-08-13 DIAGNOSIS — Z29.11 NEED FOR VACCINATION AGAINST RESPIRATORY SYNCYTIAL VIRUS: ICD-10-CM

## 2024-08-13 LAB
ANION GAP SERPL CALCULATED.3IONS-SCNC: 8 MMOL/L (ref 7–15)
BUN SERPL-MCNC: 12.1 MG/DL (ref 8–23)
CALCIUM SERPL-MCNC: 10.1 MG/DL (ref 8.8–10.4)
CHLORIDE SERPL-SCNC: 96 MMOL/L (ref 98–107)
CHOLEST SERPL-MCNC: 154 MG/DL
CREAT SERPL-MCNC: 0.67 MG/DL (ref 0.51–0.95)
CREAT UR-MCNC: 21.3 MG/DL
EGFRCR SERPLBLD CKD-EPI 2021: >90 ML/MIN/1.73M2
ERYTHROCYTE [DISTWIDTH] IN BLOOD BY AUTOMATED COUNT: 11.9 % (ref 10–15)
FASTING STATUS PATIENT QL REPORTED: YES
FASTING STATUS PATIENT QL REPORTED: YES
GLUCOSE SERPL-MCNC: 104 MG/DL (ref 70–99)
HCO3 SERPL-SCNC: 27 MMOL/L (ref 22–29)
HCT VFR BLD AUTO: 41 % (ref 35–47)
HDLC SERPL-MCNC: 61 MG/DL
HGB BLD-MCNC: 13.9 G/DL (ref 11.7–15.7)
LDLC SERPL CALC-MCNC: 75 MG/DL
MCH RBC QN AUTO: 31.3 PG (ref 26.5–33)
MCHC RBC AUTO-ENTMCNC: 33.9 G/DL (ref 31.5–36.5)
MCV RBC AUTO: 92 FL (ref 78–100)
MICROALBUMIN UR-MCNC: <12 MG/L
MICROALBUMIN/CREAT UR: NORMAL MG/G{CREAT}
NONHDLC SERPL-MCNC: 93 MG/DL
PLATELET # BLD AUTO: 285 10E3/UL (ref 150–450)
POTASSIUM SERPL-SCNC: 4.2 MMOL/L (ref 3.4–5.3)
RBC # BLD AUTO: 4.44 10E6/UL (ref 3.8–5.2)
SODIUM SERPL-SCNC: 131 MMOL/L (ref 135–145)
TRIGL SERPL-MCNC: 89 MG/DL
WBC # BLD AUTO: 6.4 10E3/UL (ref 4–11)

## 2024-08-13 PROCEDURE — 80061 LIPID PANEL: CPT | Performed by: NURSE PRACTITIONER

## 2024-08-13 PROCEDURE — 80048 BASIC METABOLIC PNL TOTAL CA: CPT | Performed by: NURSE PRACTITIONER

## 2024-08-13 PROCEDURE — 36415 COLL VENOUS BLD VENIPUNCTURE: CPT | Performed by: NURSE PRACTITIONER

## 2024-08-13 PROCEDURE — G0439 PPPS, SUBSEQ VISIT: HCPCS | Performed by: NURSE PRACTITIONER

## 2024-08-13 PROCEDURE — 82570 ASSAY OF URINE CREATININE: CPT | Performed by: NURSE PRACTITIONER

## 2024-08-13 PROCEDURE — 82043 UR ALBUMIN QUANTITATIVE: CPT | Performed by: NURSE PRACTITIONER

## 2024-08-13 PROCEDURE — 85027 COMPLETE CBC AUTOMATED: CPT | Performed by: NURSE PRACTITIONER

## 2024-08-13 PROCEDURE — 99213 OFFICE O/P EST LOW 20 MIN: CPT | Mod: 25 | Performed by: NURSE PRACTITIONER

## 2024-08-13 RX ORDER — AMLODIPINE BESYLATE 5 MG/1
5 TABLET ORAL DAILY
Qty: 90 TABLET | Refills: 3 | Status: SHIPPED | OUTPATIENT
Start: 2024-08-13

## 2024-08-13 RX ORDER — LOSARTAN POTASSIUM AND HYDROCHLOROTHIAZIDE 25; 100 MG/1; MG/1
1 TABLET ORAL DAILY
Qty: 90 TABLET | Refills: 3 | Status: SHIPPED | OUTPATIENT
Start: 2024-08-13

## 2024-08-13 ASSESSMENT — PAIN SCALES - GENERAL: PAINLEVEL: NO PAIN (0)

## 2024-08-13 NOTE — PATIENT INSTRUCTIONS
Patient Education   Preventive Care Advice   This is general advice given by our system to help you stay healthy. However, your care team may have specific advice just for you. Please talk to your care team about your preventive care needs.  Nutrition  Eat 5 or more servings of fruits and vegetables each day.  Try wheat bread, brown rice and whole grain pasta (instead of white bread, rice, and pasta).  Get enough calcium and vitamin D. Check the label on foods and aim for 100% of the RDA (recommended daily allowance).  Lifestyle  Exercise at least 150 minutes each week  (30 minutes a day, 5 days a week).  Do muscle strengthening activities 2 days a week. These help control your weight and prevent disease.  No smoking.  Wear sunscreen to prevent skin cancer.  Have a dental exam and cleaning every 6 months.  Yearly exams  See your health care team every year to talk about:  Any changes in your health.  Any medicines your care team has prescribed.  Preventive care, family planning, and ways to prevent chronic diseases.  Shots (vaccines)   HPV shots (up to age 26), if you've never had them before.  Hepatitis B shots (up to age 59), if you've never had them before.  COVID-19 shot: Get this shot when it's due.  Flu shot: Get a flu shot every year.  Tetanus shot: Get a tetanus shot every 10 years.  Pneumococcal, hepatitis A, and RSV shots: Ask your care team if you need these based on your risk.  Shingles shot (for age 50 and up)  General health tests  Diabetes screening:  Starting at age 35, Get screened for diabetes at least every 3 years.  If you are younger than age 35, ask your care team if you should be screened for diabetes.  Cholesterol test: At age 39, start having a cholesterol test every 5 years, or more often if advised.  Bone density scan (DEXA): At age 50, ask your care team if you should have this scan for osteoporosis (brittle bones).  Hepatitis C: Get tested at least once in your life.  STIs (sexually  transmitted infections)  Before age 24: Ask your care team if you should be screened for STIs.  After age 24: Get screened for STIs if you're at risk. You are at risk for STIs (including HIV) if:  You are sexually active with more than one person.  You don't use condoms every time.  You or a partner was diagnosed with a sexually transmitted infection.  If you are at risk for HIV, ask about PrEP medicine to prevent HIV.  Get tested for HIV at least once in your life, whether you are at risk for HIV or not.  Cancer screening tests  Cervical cancer screening: If you have a cervix, begin getting regular cervical cancer screening tests starting at age 21.  Breast cancer scan (mammogram): If you've ever had breasts, begin having regular mammograms starting at age 40. This is a scan to check for breast cancer.  Colon cancer screening: It is important to start screening for colon cancer at age 45.  Have a colonoscopy test every 10 years (or more often if you're at risk) Or, ask your provider about stool tests like a FIT test every year or Cologuard test every 3 years.  To learn more about your testing options, visit:   .  For help making a decision, visit:   https://bit.ly/ke69306.  Prostate cancer screening test: If you have a prostate, ask your care team if a prostate cancer screening test (PSA) at age 55 is right for you.  Lung cancer screening: If you are a current or former smoker ages 50 to 80, ask your care team if ongoing lung cancer screenings are right for you.  For informational purposes only. Not to replace the advice of your health care provider. Copyright   2023 [employer]. All rights reserved. Clinically reviewed by the [facility] Transitions Program. [employer] [id number] - REV 01/24.

## 2024-08-13 NOTE — PROGRESS NOTES
"Preventive Care Visit  [facility]  Sarah Mosley DNP, Nurse Practitioner Primary Care  Aug 13, 2024      Assessment & Plan     Encounter for Medicare annual wellness exam  Reviewed medical/social/family history and health maintenance     Hypertension goal BP (blood pressure) < 140/90   Stable, tolerating med, no changes at this time  - BASIC METABOLIC PANEL; Future  - Albumin Random Urine Quantitative with Creat Ratio; Future  - amLODIPine (NORVASC) 5 MG tablet; Take 1 tablet (5 mg) by mouth daily  - losartan-hydrochlorothiazide (HYZAAR) 100-25 MG tablet; Take 1 tablet by mouth daily  - BASIC METABOLIC PANEL  - Albumin Random Urine Quantitative with Creat Ratio    Need for shingles vaccine  Available through the pharmacy    Need for vaccination against respiratory syncytial virus  Available through the pharmacy    Screening for iron deficiency anemia  - CBC with platelets; Future  - CBC with platelets    Lipid screening  - Lipid panel reflex to direct LDL Fasting; Future  - Lipid panel reflex to direct LDL Fasting    Patient has been advised of split billing requirements and indicates understanding: Yes        BMI  Estimated body mass index is 25.52 kg/m  as calculated from the following:    Height as of this encounter: 1.67 m (5' 5.75\").    Weight as of this encounter: 71.2 kg (156 lb 14.4 oz).       Counseling  Appropriate preventive services were addressed with this patient via screening, questionnaire, or discussion as appropriate for fall prevention, nutrition, physical activity, Tobacco-use cessation, weight loss and cognition.  Checklist reviewing preventive services available has been given to the patient.  Reviewed patient's diet, addressing concerns and/or questions.   The patient was instructed to see the dentist every 6 months.   She is at risk for psychosocial distress and has been provided with information to reduce risk.           Subjective   Home is a 68 year old, " presenting for the following:  Medicare Visit        8/13/2024     9:19 AM   Additional Questions   Roomed by ANDRE Prescott   Accompanied by self         8/13/2024     9:19 AM   Patient Reported Additional Medications   Patient reports taking the following new medications none         Health Care Directive  Patient does not have a Health Care Directive or Living Will: Discussed advance care planning with patient; information given to patient to review.    HPI        8/12/2024   General Health   How would you rate your overall physical health? Good   Feel stress (tense, anxious, or unable to sleep) Only a little      (!) STRESS CONCERN      8/12/2024   Nutrition   Diet: Low salt            8/12/2024   Exercise   Days per week of moderate/strenous exercise 5 days   Average minutes spent exercising at this level 50 min            8/12/2024   Social Factors   Frequency of gathering with friends or relatives Three times a week   Worry food won't last until get money to buy more No   Food not last or not have enough money for food? No   Do you have housing? (Housing is defined as stable permanent housing and does not include staying ouside in a car, in a tent, in an abandoned building, in an overnight shelter, or couch-surfing.) Yes   Are you worried about losing your housing? No   Lack of transportation? No   Unable to get utilities (heat,electricity)? No            8/12/2024   Fall Risk   Fallen 2 or more times in the past year? No    No   Trouble with walking or balance? No    No       Multiple values from one day are sorted in reverse-chronological order          8/12/2024   Activities of Daily Living- Home Safety   Needs help with the following daily activites None of the above   Safety concerns in the home None of the above            8/12/2024   Dental   Dentist two times every year? (!) NO            8/12/2024   Hearing Screening   Hearing concerns? None of the above            8/12/2024   Driving Risk Screening    Patient/family members have concerns about driving No            8/12/2024   General Alertness/Fatigue Screening   Have you been more tired than usual lately? No            8/12/2024   Urinary Incontinence Screening   Bothered by leaking urine in past 6 months No            8/12/2024   TB Screening   Were you born outside of the [place]? No            Today's PHQ-2 Score:       8/12/2024    11:02 AM   PHQ-2 ( 1999 [employer])   Q1: Little interest or pleasure in doing things 0   Q2: Feeling down, depressed or hopeless 0   PHQ-2 Score 0   Q1: Little interest or pleasure in doing things Not at all   Q2: Feeling down, depressed or hopeless Not at all   PHQ-2 Score 0           8/12/2024   Substance Use   Alcohol more than 3/day or more than 7/wk Not Applicable   Do you have a current opioid prescription? No   How severe/bad is pain from 1 to 10? 0/10 (No Pain)   Do you use any other substances recreationally? No        Social History     Tobacco Use    Smoking status: Never    Smokeless tobacco: Never   Vaping Use    Vaping status: Never Used   Substance Use Topics    Alcohol use: No    Drug use: No           7/18/2024   LAST FHS-7 RESULTS   1st degree relative breast or ovarian cancer Yes   Any relative bilateral breast cancer No   Any male have breast cancer No   Any ONE woman have BOTH breast AND ovarian cancer No   Any woman with breast cancer before 50yrs No   2 or more relatives with breast AND/OR ovarian cancer Yes   2 or more relatives with breast AND/OR bowel cancer No           Mammogram Screening - Mammogram every 1-2 years updated in Health Maintenance based on mutual decision making      History of abnormal Pap smear: No - age 65 or older with adequate negative prior screening test results (3 consecutive negative cytology results, 2 consecutive negative cotesting results, or 2 consecutive negative HrHPV test results within 10 years, with the most recent test occurring within the recommended screening interval for  "the test used)       ASCVD Risk   The 10-year ASCVD risk score (Flakita VILLALBA, et al., 2019) is: 10.8%    Values used to calculate the score:      Age: 68 years      Sex: Female      Is Non- : No      Diabetic: No      Tobacco smoker: No      Systolic Blood Pressure: 132 mmHg      Is BP treated: Yes      HDL Cholesterol: 58 mg/dL      Total Cholesterol: 149 mg/dL            Reviewed and updated as needed this visit by Provider                      Current providers sharing in care for this patient include:  Patient Care Team:  Sarah Mosley DNP as PCP - General (Nurse Practitioner Primary Care)  Sarah Mosley DNP as Assigned PCP    The following health maintenance items are reviewed in Epic and correct as of today:  Health Maintenance   Topic Date Due    RSV VACCINE (Pregnancy & 60+) (1 - 1-dose 60+ series) Never done    ZOSTER IMMUNIZATION (2 of 3) 04/16/2015    COVID-19 Vaccine (6 - 2023-24 season) 02/11/2024    BMP  07/25/2024    MICROALBUMIN  07/25/2024    ANNUAL REVIEW OF HM ORDERS  07/25/2024    MEDICARE ANNUAL WELLNESS VISIT  07/25/2024    INFLUENZA VACCINE (1) 09/01/2024    DEXA  07/12/2025    MAMMO SCREENING  07/18/2025    FALL RISK ASSESSMENT  08/13/2025    GLUCOSE  07/25/2026    LIPID  07/25/2028    ADVANCE CARE PLANNING  07/25/2028    COLORECTAL CANCER SCREENING  06/05/2029    DTAP/TDAP/TD IMMUNIZATION (4 - Td or Tdap) 09/08/2030    HEPATITIS C SCREENING  Completed    PHQ-2 (once per calendar year)  Completed    Pneumococcal Vaccine: 65+ Years  Completed    IPV IMMUNIZATION  Aged Out    HPV IMMUNIZATION  Aged Out    MENINGITIS IMMUNIZATION  Aged Out    RSV MONOCLONAL ANTIBODY  Aged Out            Objective    Exam  /78 (BP Location: Right arm, Patient Position: Sitting, Cuff Size: Adult Regular)   Pulse 68   Temp 98.8  F (37.1  C) (Temporal)   Resp 21   Ht 1.67 m (5' 5.75\")   Wt 71.2 kg (156 lb 14.4 oz)   SpO2 99%   BMI 25.52 kg/m     Estimated body mass " "index is 25.52 kg/m  as calculated from the following:    Height as of this encounter: 1.67 m (5' 5.75\").    Weight as of this encounter: 71.2 kg (156 lb 14.4 oz).    Physical Exam  GENERAL: alert and no distress  EYES: Eyes grossly normal to inspection, PERRL and conjunctivae and sclerae normal  HENT: ear canals and TM's normal, nose and mouth without ulcers or lesions  NECK: no adenopathy, no asymmetry, masses, or scars  RESP: lungs clear to auscultation - no rales, rhonchi or wheezes  CV: regular rate and rhythm, normal S1 S2, no S3 or S4, no murmur, click or rub, no peripheral edema  ABDOMEN: soft, nontender, no hepatosplenomegaly, no masses and bowel sounds normal  MS: no gross musculoskeletal defects noted, no edema        8/13/2024   Mini Cog   Clock Draw Score 2 Normal   3 Item Recall 3 objects recalled   Mini Cog Total Score 5                 Signed Electronically by: Sarah Mosley DNP    "

## 2024-12-11 NOTE — PATIENT INSTRUCTIONS
Use hydrocortisone 2.5% on your finger two times per day.  Once it dries, apply aquaphor ointment on top.  Let's try this for 2 weeks.  If no improvement, follow up with your dermatologist.      Dr. Beck

## 2024-12-12 ENCOUNTER — OFFICE VISIT (OUTPATIENT)
Dept: FAMILY MEDICINE | Facility: CLINIC | Age: 69
End: 2024-12-12
Payer: MEDICARE

## 2024-12-12 VITALS
TEMPERATURE: 97.7 F | RESPIRATION RATE: 20 BRPM | DIASTOLIC BLOOD PRESSURE: 68 MMHG | BODY MASS INDEX: 26.4 KG/M2 | OXYGEN SATURATION: 99 % | HEART RATE: 79 BPM | SYSTOLIC BLOOD PRESSURE: 132 MMHG | WEIGHT: 162.3 LBS

## 2024-12-12 DIAGNOSIS — L30.9 DERMATITIS: Primary | ICD-10-CM

## 2024-12-12 RX ORDER — HYDROCORTISONE 25 MG/G
CREAM TOPICAL 2 TIMES DAILY
Qty: 30 G | Refills: 0 | Status: SHIPPED | OUTPATIENT
Start: 2024-12-12

## 2024-12-12 ASSESSMENT — PAIN SCALES - GENERAL: PAINLEVEL_OUTOF10: NO PAIN (1)

## 2024-12-12 NOTE — PROGRESS NOTES
Assessment & Plan     Dermatitis  The top of her R middle finger has a patch of dry/broken skin with surrounding erythema. No pus, fluctuance, pain or warmth. She tried neosporin and aquaphor w/o relief. No worsening and no improvement of rash. Appears to be dermatitis/dry skin. No signs of infection. Will use hydrocortisone cream BID followed by aquaphor BID for 2 weeks. If no improvement, should see her dermatologist.  - hydrocortisone 2.5 % cream  Dispense: 30 g; Refill: 0        Subjective   Home is a 69 year old, presenting for the following health issues:  Finger (RT hand, middle finger, redness, inflamed,not due to injury, gets hot and stings(when hands are sweating) not healing)        12/12/2024     8:40 AM   Additional Questions   Roomed by TAYO Bhat   Accompanied by Self     History of Present Illness       Reason for visit:  Skin on back of finger is inflamed and hasnt healed for 3 weeks  Symptom onset:  3-4 weeks ago  Symptoms include:  Red, inflamed skin on back of right hand ring finger  Symptom intensity:  Mild  Symptom progression:  Staying the same  Had these symptoms before:  No  What makes it worse:  If it gets sweaty, it stings  What makes it better:  No, it stays the same no matter what I have tried   She is taking medications regularly.           Objective    /68 (BP Location: Right arm, Patient Position: Sitting, Cuff Size: Adult Regular)   Pulse 79   Temp 97.7  F (36.5  C) (Temporal)   Resp 20   Wt 73.6 kg (162 lb 4.8 oz)   SpO2 99%   BMI 26.40 kg/m    Body mass index is 26.4 kg/m .    Physical Exam  Skin:     General: Skin is warm.      Comments: Top of R middle finger has a patch of dry/broken skin with surrounding erythema. No pus, fluctuance, pain or warmth.                  Signed Electronically by: Denis Beck DO

## 2025-01-16 ENCOUNTER — TRANSFERRED RECORDS (OUTPATIENT)
Dept: HEALTH INFORMATION MANAGEMENT | Facility: CLINIC | Age: 70
End: 2025-01-16
Payer: MEDICARE

## 2025-02-05 ENCOUNTER — TELEPHONE (OUTPATIENT)
Dept: FAMILY MEDICINE | Facility: CLINIC | Age: 70
End: 2025-02-05
Payer: MEDICARE

## 2025-02-05 NOTE — TELEPHONE ENCOUNTER
Reason for Call:  Appointment Request    Patient requesting this type of appt: Pre-op    Requested provider: Sarah Mosley    Reason patient unable to be scheduled: Not within requested timeframe    When does patient want to be seen/preferred time: 3-7 days    Comments: DOS 2/19 - cataract    Could we send this information to you in [facility] or would you prefer to receive a phone call?:   No preference   Okay to leave a detailed message?: Yes at Cell number on file:    Telephone Information:   Mobile [phone]       Call taken on 2/5/2025 at 1:31 PM by Karen Luz

## 2025-02-11 ENCOUNTER — OFFICE VISIT (OUTPATIENT)
Dept: FAMILY MEDICINE | Facility: CLINIC | Age: 70
End: 2025-02-11
Payer: MEDICARE

## 2025-02-11 VITALS
HEIGHT: 66 IN | SYSTOLIC BLOOD PRESSURE: 134 MMHG | TEMPERATURE: 97.6 F | RESPIRATION RATE: 12 BRPM | OXYGEN SATURATION: 99 % | BODY MASS INDEX: 25.31 KG/M2 | HEART RATE: 67 BPM | WEIGHT: 157.5 LBS | DIASTOLIC BLOOD PRESSURE: 72 MMHG

## 2025-02-11 DIAGNOSIS — Z01.818 PREOP GENERAL PHYSICAL EXAM: Primary | ICD-10-CM

## 2025-02-11 DIAGNOSIS — I10 HYPERTENSION GOAL BP (BLOOD PRESSURE) < 140/90: ICD-10-CM

## 2025-02-11 DIAGNOSIS — H40.001 GLAUCOMA SUSPECT, RIGHT: ICD-10-CM

## 2025-02-11 DIAGNOSIS — H25.013 CORTICAL AGE-RELATED CATARACT OF BOTH EYES: ICD-10-CM

## 2025-02-11 PROCEDURE — 99213 OFFICE O/P EST LOW 20 MIN: CPT | Performed by: NURSE PRACTITIONER

## 2025-02-11 RX ORDER — CLOBETASOL PROPIONATE 0.5 MG/G
OINTMENT TOPICAL
COMMUNITY
Start: 2025-01-16

## 2025-02-11 ASSESSMENT — PAIN SCALES - GENERAL: PAINLEVEL_OUTOF10: NO PAIN (0)

## 2025-02-11 NOTE — LETTER
2/11/2025      [facility]  [address]  [facility] [address]        Preoperative Evaluation  [facility]  [address]  [address]  [place] [address]  Phone: [phone]  Fax: [phone]  Primary Provider: Sarah Mosley DNP  Pre-op Performing Provider: Sarah Mosley DNP  Feb 11, 2025 2/6/2025   Surgical Information   What procedure is being done? cataract surgery   Facility or Hospital where procedure/surgery will be performed: [facility]   Who is doing the procedure / surgery? Dr. Dallas Escamilla   Date of surgery / procedure: Feb. 19 - right eye; Feb. 26 - left eye   Time of surgery / procedure: morning; exact time tbd   Where do you plan to recover after surgery? at home alone     Fax number for surgical facility: [phone]    Assessment & Plan    The proposed surgical procedure is considered LOW risk.    Preop general physical exam  Reviewed medical/social/family history and health maintenance     Cortical age-related cataract of both eyes  Glaucoma suspect, right      Hypertension goal BP (blood pressure) < 140/90   Stable, tolerating med, no changes at this time              - No identified additional risk factors other than previously addressed    Antiplatelet or Anticoagulation Medication Instructions   - We reviewed the medication list and the patient is not on an antiplatelet or anticoagulation medications.   - Bleeding risk is low for this procedure (e.g. dental, skin, cataract).    Additional Medication Instructions  We reviewed the medication list and there are no chronic medications that need to be adjusted for this procedure.    Recommendation  Approval given to proceed with proposed procedure, without further diagnostic evaluation.    Mady Bhat is a 69 year old, presenting for the following:  Pre-Op Exam          2/11/2025    11:26 AM   Additional Questions   Roomed by Chaya GARDINER   Accompanied by  self         2/11/2025    11:26 AM   Patient Reported Additional Medications   Patient reports taking the following new medications no     HPI related to upcoming procedure: Bilateral cataract surgery        2/6/2025   Pre-Op Questionnaire   Have you ever had a heart attack or stroke? No   Have you ever had surgery on your heart or blood vessels, such as a stent placement, a coronary artery bypass, or surgery on an artery in your head, neck, heart, or legs? No   Do you have chest pain with activity? No   Do you have a history of heart failure? No   Do you currently have a cold, bronchitis or symptoms of other infection? No   Do you have a cough, shortness of breath, or wheezing? No   Do you or anyone in your family have previous history of blood clots? No   Do you or does anyone in your family have a serious bleeding problem such as prolonged bleeding following surgeries or cuts? No   Have you ever had problems with anemia or been told to take iron pills? No   Have you had any abnormal blood loss such as black, tarry or bloody stools, or abnormal vaginal bleeding? No   Have you ever had a blood transfusion? No   Are you willing to have a blood transfusion if it is medically needed before, during, or after your surgery? Yes   Have you or any of your relatives ever had problems with anesthesia? No   Do you have sleep apnea, excessive snoring or daytime drowsiness? No   Do you have any artifical heart valves or other implanted medical devices like a pacemaker, defibrillator, or continuous glucose monitor? No   Do you have artificial joints? No   Are you allergic to latex? No     Health Care Directive  Patient does not have a Health Care Directive: Advance Directive received and scanned. Click on Code in the patient header to view.    Preoperative Review of    reviewed - no record of controlled substances prescribed.          Patient Active Problem List    Diagnosis Date Noted     Neck pain 03/17/2021      Priority: Medium     Cervical radiculitis 03/17/2021     Priority: Medium     Calcium blood increased 08/27/2014     Priority: Medium     Glaucoma 08/25/2014     Priority: Medium     Left shoulder pain 07/13/2012     Priority: Medium     Hypertension goal BP (blood pressure) < 140/90 07/13/2012     Priority: Medium     Rosacea 07/13/2012     Priority: Medium     CARDIOVASCULAR SCREENING; LDL GOAL LESS THAN 160 07/13/2012     Priority: Medium     Family history of malignant neoplasm of breast 10/12/2010     Priority: Medium     sister, age 51       Family history of colon cancer 10/12/2010     Priority: Medium     mother, age 70's       Seasonal allergic rhinitis 10/12/2010     Priority: Medium      Past Medical History:   Diagnosis Date     Allergic rhinitis      Atopic dermatitis      Cataract      Family history of colon cancer      Family history of colon cancer      Family history of colon cancer      Family history of malignant neoplasm of breast      Family history of malignant neoplasm of breast      Family history of malignant neoplasm of breast      Glaucoma      Hypertension goal BP (blood pressure) < 140/90 7/13/2012     Rosacea      Past Surgical History:   Procedure Laterality Date     COLONOSCOPY N/A 6/5/2019    Procedure: COLONOSCOPY;  Surgeon: Belkis Beltre MD;  Location:  GI     HYSTERECTOMY, PAP NO LONGER INDICATED  2002    [facility]     [facility] COLONOSCOPY THRU STOMA, DIAGNOSTIC  4/2009     Current Outpatient Medications   Medication Sig Dispense Refill     amLODIPine (NORVASC) 5 MG tablet Take 1 tablet (5 mg) by mouth daily 90 tablet 3     doxycycline hyclate (VIBRAMYCIN) 50 MG capsule Take 50 mg by mouth daily       latanoprost (XALATAN) 0.005 % ophthalmic solution 1 drop At Bedtime.       losartan-hydrochlorothiazide (HYZAAR) 100-25 MG tablet Take 1 tablet by mouth daily 90 tablet 3     metroNIDAZOLE (METROGEL) 1 % external gel Apply 1 g topically daily . Managed by Dermatology.        "Multiple Vitamins-Minerals (MULTIVITAL PO) Take by mouth daily       sulfacetamide sodium (Acne) 10 % lotion daily . Managed by dermatology.       timolol (TIMOPTIC-XE) 0.5 % ophthalmic gel-forming        clobetasol (TEMOVATE) 0.05 % external ointment  (Patient not taking: Reported on 2/11/2025)       hydrocortisone 2.5 % cream Apply topically 2 times daily. 30 g 0       Allergies   Allergen Reactions     Nkda [No Known Drug Allergy]         Social History     Tobacco Use     Smoking status: Never     Smokeless tobacco: Never   Substance Use Topics     Alcohol use: No       History   Drug Use No               Objective   /72 (BP Location: Right arm, Patient Position: Sitting, Cuff Size: Adult Regular)   Pulse 67   Temp 97.6  F (36.4  C) (Temporal)   Resp 12   Ht 1.67 m (5' 5.75\")   Wt 71.4 kg (157 lb 8 oz)   SpO2 99%   BMI 25.62 kg/m     Estimated body mass index is 25.62 kg/m  as calculated from the following:    Height as of this encounter: 1.67 m (5' 5.75\").    Weight as of this encounter: 71.4 kg (157 lb 8 oz).  Physical Exam  GENERAL: alert and no distress  EYES: Eyes grossly normal to inspection, PERRL and conjunctivae and sclerae normal  HENT: ear canals and TM's normal, nose and mouth without ulcers or lesions  NECK: no adenopathy, no asymmetry, masses, or scars  RESP: lungs clear to auscultation - no rales, rhonchi or wheezes  CV: regular rate and rhythm, normal S1 S2, no S3 or S4, no murmur, click or rub, no peripheral edema  ABDOMEN: soft, nontender, no hepatosplenomegaly, no masses and bowel sounds normal  MS: no gross musculoskeletal defects noted, no edema    Recent Labs   Lab Test 08/13/24  1010   HGB 13.9      *   POTASSIUM 4.2   CR 0.67        Diagnostics  No labs were ordered during this visit.   No EKG required for low risk surgery (cataract, skin procedure, breast biopsy, etc).    Revised Cardiac Risk Index (RCRI)  The patient has the following serious cardiovascular " risks for perioperative complications:   - No serious cardiac risks = 0 points     RCRI Interpretation: 0 points: Class I (very low risk - 0.4% complication rate)         Signed Electronically by: Sarah Mosley DNP  A copy of this evaluation report is provided to the requesting physician.             Sincerely,        Sarah Mosley DNP    Electronically signed

## 2025-02-11 NOTE — PATIENT INSTRUCTIONS

## 2025-02-11 NOTE — PROGRESS NOTES
Preoperative Evaluation  [address]  [address]  [place] [address]  Phone: [phone]  Fax: [phone]  Primary Provider: Sarah Mosley DNP  Pre-op Performing Provider: Sarah Mosley DNP  Feb 11, 2025 2/6/2025   Surgical Information   What procedure is being done? cataract surgery   Facility or Hospital where procedure/surgery will be performed: [facility]   Who is doing the procedure / surgery? Dr. Dallas Escamilla   Date of surgery / procedure: Feb. 19 - right eye; Feb. 26 - left eye   Time of surgery / procedure: morning; exact time tbd   Where do you plan to recover after surgery? at home alone     Fax number for surgical facility: [phone]    Assessment & Plan     The proposed surgical procedure is considered LOW risk.    Preop general physical exam  Reviewed medical/social/family history and health maintenance     Cortical age-related cataract of both eyes  Glaucoma suspect, right      Hypertension goal BP (blood pressure) < 140/90   Stable, tolerating med, no changes at this time              - No identified additional risk factors other than previously addressed    Antiplatelet or Anticoagulation Medication Instructions   - We reviewed the medication list and the patient is not on an antiplatelet or anticoagulation medications.   - Bleeding risk is low for this procedure (e.g. dental, skin, cataract).    Additional Medication Instructions  We reviewed the medication list and there are no chronic medications that need to be adjusted for this procedure.    Recommendation  Approval given to proceed with proposed procedure, without further diagnostic evaluation.    Mady Bhat is a 69 year old, presenting for the following:  Pre-Op Exam          2/11/2025    11:26 AM   Additional Questions   Roomed by Chaya GARDINER   Accompanied by self         2/11/2025    11:26 AM   Patient Reported Additional Medications    Patient reports taking the following new medications no     HPI related to upcoming procedure: Bilateral cataract surgery        2/6/2025   Pre-Op Questionnaire   Have you ever had a heart attack or stroke? No   Have you ever had surgery on your heart or blood vessels, such as a stent placement, a coronary artery bypass, or surgery on an artery in your head, neck, heart, or legs? No   Do you have chest pain with activity? No   Do you have a history of heart failure? No   Do you currently have a cold, bronchitis or symptoms of other infection? No   Do you have a cough, shortness of breath, or wheezing? No   Do you or anyone in your family have previous history of blood clots? No   Do you or does anyone in your family have a serious bleeding problem such as prolonged bleeding following surgeries or cuts? No   Have you ever had problems with anemia or been told to take iron pills? No   Have you had any abnormal blood loss such as black, tarry or bloody stools, or abnormal vaginal bleeding? No   Have you ever had a blood transfusion? No   Are you willing to have a blood transfusion if it is medically needed before, during, or after your surgery? Yes   Have you or any of your relatives ever had problems with anesthesia? No   Do you have sleep apnea, excessive snoring or daytime drowsiness? No   Do you have any artifical heart valves or other implanted medical devices like a pacemaker, defibrillator, or continuous glucose monitor? No   Do you have artificial joints? No   Are you allergic to latex? No     Health Care Directive  Patient does not have a Health Care Directive: Advance Directive received and scanned. Click on Code in the patient header to view.    Preoperative Review of    reviewed - no record of controlled substances prescribed.          Patient Active Problem List    Diagnosis Date Noted    Neck pain 03/17/2021     Priority: Medium    Cervical radiculitis 03/17/2021     Priority: Medium    Calcium  blood increased 08/27/2014     Priority: Medium    Glaucoma 08/25/2014     Priority: Medium    Left shoulder pain 07/13/2012     Priority: Medium    Hypertension goal BP (blood pressure) < 140/90 07/13/2012     Priority: Medium    Rosacea 07/13/2012     Priority: Medium    CARDIOVASCULAR SCREENING; LDL GOAL LESS THAN 160 07/13/2012     Priority: Medium    Family history of malignant neoplasm of breast 10/12/2010     Priority: Medium     sister, age 51      Family history of colon cancer 10/12/2010     Priority: Medium     mother, age 70's      Seasonal allergic rhinitis 10/12/2010     Priority: Medium      Past Medical History:   Diagnosis Date    Allergic rhinitis     Atopic dermatitis     Cataract     Family history of colon cancer     Family history of colon cancer     Family history of colon cancer     Family history of malignant neoplasm of breast     Family history of malignant neoplasm of breast     Family history of malignant neoplasm of breast     Glaucoma     Hypertension goal BP (blood pressure) < 140/90 7/13/2012    Rosacea      Past Surgical History:   Procedure Laterality Date    COLONOSCOPY N/A 6/5/2019    Procedure: COLONOSCOPY;  Surgeon: Belkis Beltre MD;  Location:  GI    HYSTERECTOMY, PAP NO LONGER INDICATED  2002    [facility]  4/2009     Current Outpatient Medications   Medication Sig Dispense Refill    amLODIPine (NORVASC) 5 MG tablet Take 1 tablet (5 mg) by mouth daily 90 tablet 3    doxycycline hyclate (VIBRAMYCIN) 50 MG capsule Take 50 mg by mouth daily      latanoprost (XALATAN) 0.005 % ophthalmic solution 1 drop At Bedtime.      losartan-hydrochlorothiazide (HYZAAR) 100-25 MG tablet Take 1 tablet by mouth daily 90 tablet 3    metroNIDAZOLE (METROGEL) 1 % external gel Apply 1 g topically daily . Managed by Dermatology.      Multiple Vitamins-Minerals (MULTIVITAL PO) Take by mouth daily      sulfacetamide sodium (Acne) 10 % lotion daily .  "Managed by dermatology.      timolol (TIMOPTIC-XE) 0.5 % ophthalmic gel-forming       clobetasol (TEMOVATE) 0.05 % external ointment  (Patient not taking: Reported on 2/11/2025)      hydrocortisone 2.5 % cream Apply topically 2 times daily. 30 g 0       Allergies   Allergen Reactions    Nkda [No Known Drug Allergy]         Social History     Tobacco Use    Smoking status: Never    Smokeless tobacco: Never   Substance Use Topics    Alcohol use: No       History   Drug Use No               Objective    /72 (BP Location: Right arm, Patient Position: Sitting, Cuff Size: Adult Regular)   Pulse 67   Temp 97.6  F (36.4  C) (Temporal)   Resp 12   Ht 1.67 m (5' 5.75\")   Wt 71.4 kg (157 lb 8 oz)   SpO2 99%   BMI 25.62 kg/m     Estimated body mass index is 25.62 kg/m  as calculated from the following:    Height as of this encounter: 1.67 m (5' 5.75\").    Weight as of this encounter: 71.4 kg (157 lb 8 oz).  Physical Exam  GENERAL: alert and no distress  EYES: Eyes grossly normal to inspection, PERRL and conjunctivae and sclerae normal  HENT: ear canals and TM's normal, nose and mouth without ulcers or lesions  NECK: no adenopathy, no asymmetry, masses, or scars  RESP: lungs clear to auscultation - no rales, rhonchi or wheezes  CV: regular rate and rhythm, normal S1 S2, no S3 or S4, no murmur, click or rub, no peripheral edema  ABDOMEN: soft, nontender, no hepatosplenomegaly, no masses and bowel sounds normal  MS: no gross musculoskeletal defects noted, no edema    Recent Labs   Lab Test 08/13/24  1010   HGB 13.9      *   POTASSIUM 4.2   CR 0.67        Diagnostics  No labs were ordered during this visit.   No EKG required for low risk surgery (cataract, skin procedure, breast biopsy, etc).    Revised Cardiac Risk Index (RCRI)  The patient has the following serious cardiovascular risks for perioperative complications:   - No serious cardiac risks = 0 points     RCRI Interpretation: 0 points: Class I " (very low risk - 0.4% complication rate)         Signed Electronically by: Sarah Mosley DNP  A copy of this evaluation report is provided to the requesting physician.

## 2025-02-12 ENCOUNTER — TELEPHONE (OUTPATIENT)
Dept: FAMILY MEDICINE | Facility: CLINIC | Age: 70
End: 2025-02-12
Payer: MEDICARE

## 2025-02-12 ENCOUNTER — TRANSFERRED RECORDS (OUTPATIENT)
Dept: HEALTH INFORMATION MANAGEMENT | Facility: CLINIC | Age: 70
End: 2025-02-12
Payer: MEDICARE

## 2025-02-12 NOTE — TELEPHONE ENCOUNTER
Pt came in yesterday for Pre-op. Health Care Directive was given; however, only the front page was copied onto both sides of the sheet and the second page is missing.    Called Home - let her know we need to obtain a new copy with both front and back of the health care directive.    Home was upset not understanding how this happened, let her know this is the copy she gave us and it needs to be both pages 1 and 2 and we only have page 1 copied on both front and back which means this is not a completed HCD.    Informed her she can drop off at the  or bring along to . She declines to bring to  as she states it will not be added to chart. Let her know to drop off at reception then.

## 2025-02-26 ENCOUNTER — DOCUMENTATION ONLY (OUTPATIENT)
Dept: OTHER | Facility: CLINIC | Age: 70
End: 2025-02-26
Payer: MEDICARE

## 2025-04-22 ENCOUNTER — NURSE TRIAGE (OUTPATIENT)
Dept: FAMILY MEDICINE | Facility: CLINIC | Age: 70
End: 2025-04-22

## 2025-04-22 ENCOUNTER — OFFICE VISIT (OUTPATIENT)
Dept: FAMILY MEDICINE | Facility: CLINIC | Age: 70
End: 2025-04-22
Payer: MEDICARE

## 2025-04-22 VITALS
RESPIRATION RATE: 16 BRPM | DIASTOLIC BLOOD PRESSURE: 78 MMHG | BODY MASS INDEX: 24.64 KG/M2 | OXYGEN SATURATION: 96 % | TEMPERATURE: 97.4 F | WEIGHT: 157 LBS | HEART RATE: 77 BPM | HEIGHT: 67 IN | SYSTOLIC BLOOD PRESSURE: 136 MMHG

## 2025-04-22 DIAGNOSIS — H69.93 DYSFUNCTION OF BOTH EUSTACHIAN TUBES: Primary | ICD-10-CM

## 2025-04-22 PROCEDURE — 1126F AMNT PAIN NOTED NONE PRSNT: CPT | Performed by: NURSE PRACTITIONER

## 2025-04-22 PROCEDURE — 99213 OFFICE O/P EST LOW 20 MIN: CPT | Performed by: NURSE PRACTITIONER

## 2025-04-22 PROCEDURE — 3078F DIAST BP <80 MM HG: CPT | Performed by: NURSE PRACTITIONER

## 2025-04-22 PROCEDURE — 3075F SYST BP GE 130 - 139MM HG: CPT | Performed by: NURSE PRACTITIONER

## 2025-04-22 ASSESSMENT — PAIN SCALES - GENERAL: PAINLEVEL_OUTOF10: NO PAIN (0)

## 2025-04-22 NOTE — TELEPHONE ENCOUNTER
"PT is leaving on vacation soon.  Would like the right ear treated for this ear wax.  Had treatment for ear wax a long time ago.  Made acute appt for today with PCP.  Juan, Triage RN  [facility]/ [phone]                 Reason for Disposition   Patient wants to be seen    Additional Information   Negative: Ear injury is main concern   Negative: Injury to ear canal from cotton swab (e.g., Q-tip) or doctor's ear exam   Negative: Foreign body stuck in the ear (e.g., bug, piece of cotton)   Negative: Sudden onset of hearing loss   Negative: Dizziness is main symptom   Negative: Pain or discomfort in or around ear is main symptom   Negative: Patient sounds very sick or weak to the triager   Negative: Sudden onset of ear pain or bleeding after long, thin object was inserted into the ear canal (e.g., pencil, Q-tip)   Negative: Ear pain after ear syringing (i.e., squirting liquid into ear canal to remove wax) and severe   Negative: Ear pain after ear syringing (i.e., squirting liquid into ear canal to remove wax) and lasts > 1 hour   Negative: Walking is very unsteady or feels very dizzy  (Exception: Brief dizziness that occurs with ear syringing.)   Negative: Redness or swelling of outer ear (ear lobe)   Negative: Yellow or green discharge (pus) from ear canal   Negative: Bloody discharge or unexplained bleeding from ear canal    Answer Assessment - Initial Assessment Questions  1. LOCATION: \"Which ear is involved?\"       Right ear.  2. SYMPTOMS: \"What are the main symptoms?\" (e.g., fullness, decreased hearing, itching, discomfort)      Tunnel hearing.  3. ONSET: \"When did the    start?\"      Didn't ask.  4. PAIN: \"Is there any earache?\" \"How bad is it?\"  (Scale 0-10; or none, mild, moderate, severe)      no  5. OBJECTS: \"Do you use cotton swabs (Q-tips) in your ear?\" \"Have you put anything else in your ear?\"      no  6. EARWAX HISTORY: \"Have you had problems with earwax before?\" If Yes, " "ask: \"What did you do the last time?\"      Has had ear wax a long time ago.    Protocols used: Earwax-A-OH    "

## 2025-04-22 NOTE — PATIENT INSTRUCTIONS
Mucinex extended release 1200mg twice daily  Increase Flonase to twice daily  Can also use oral allergy medication like Zyrtec, Claritin, or Allegra.

## 2025-04-22 NOTE — PROGRESS NOTES
Assessment & Plan     Dysfunction of both eustachian tubes  Symptoms consistent with ETD.  Recommended increasing Flonase to BID and starting Mucinex.  Can also consider oral allergy medication given history of seasonal allergies.  Follow up if symptoms worsen or persist.         Subjective   Home is a 69 year old, presenting for the following health issues:  Ear Problem      4/22/2025    11:15 AM   Additional Questions   Roomed by DIONICIO Rodriguez     Ear Problem    History of Present Illness       Reason for visit:  Plugged ears  Symptom onset:  3-4 weeks ago  Symptoms include:  Ears clogged in the morning  Symptom intensity:  Mild  Symptom progression:  Staying the same  Had these symptoms before:  No   She is taking medications regularly.          Also feeling congested.  Using Flonase once daily at night  Has history of seasonal allergies.    Ear feels full when laying on her side.             Objective    There were no vitals taken for this visit.  There is no height or weight on file to calculate BMI.  Physical Exam   GENERAL: alert and no distress  HENT: ear canals and TM's normal, nose and mouth without ulcers or lesions            Signed Electronically by: Sarah Mosley DNP

## 2025-06-18 ENCOUNTER — PATIENT OUTREACH (OUTPATIENT)
Dept: CARE COORDINATION | Facility: CLINIC | Age: 70
End: 2025-06-18
Payer: MEDICARE

## 2025-07-14 ENCOUNTER — PATIENT OUTREACH (OUTPATIENT)
Dept: CARE COORDINATION | Facility: CLINIC | Age: 70
End: 2025-07-14
Payer: MEDICARE

## 2025-07-16 ENCOUNTER — PATIENT OUTREACH (OUTPATIENT)
Dept: CARE COORDINATION | Facility: CLINIC | Age: 70
End: 2025-07-16
Payer: MEDICARE

## 2025-07-28 ENCOUNTER — PATIENT OUTREACH (OUTPATIENT)
Dept: CARE COORDINATION | Facility: CLINIC | Age: 70
End: 2025-07-28
Payer: MEDICARE

## 2025-07-29 ENCOUNTER — ANCILLARY PROCEDURE (OUTPATIENT)
Dept: MAMMOGRAPHY | Facility: CLINIC | Age: 70
End: 2025-07-29
Attending: NURSE PRACTITIONER
Payer: MEDICARE

## 2025-07-29 DIAGNOSIS — Z12.31 VISIT FOR SCREENING MAMMOGRAM: ICD-10-CM

## 2025-07-29 PROCEDURE — 77067 SCR MAMMO BI INCL CAD: CPT | Mod: TC | Performed by: RADIOLOGY

## 2025-07-29 PROCEDURE — 77063 BREAST TOMOSYNTHESIS BI: CPT | Mod: TC | Performed by: RADIOLOGY

## (undated) RX ORDER — FENTANYL CITRATE 50 UG/ML
INJECTION, SOLUTION INTRAMUSCULAR; INTRAVENOUS
Status: DISPENSED
Start: 2019-06-05